# Patient Record
Sex: MALE | Race: WHITE | Employment: FULL TIME | URBAN - METROPOLITAN AREA
[De-identification: names, ages, dates, MRNs, and addresses within clinical notes are randomized per-mention and may not be internally consistent; named-entity substitution may affect disease eponyms.]

---

## 2017-02-22 ENCOUNTER — HOSPITAL ENCOUNTER (OUTPATIENT)
Dept: MRI IMAGING | Age: 47
Discharge: HOME | End: 2017-02-22
Attending: ORTHOPAEDIC SURGERY
Payer: COMMERCIAL

## 2017-02-22 DIAGNOSIS — M25.562 LEFT KNEE PAIN: ICD-10-CM

## 2017-02-22 PROCEDURE — 73721 MRI JNT OF LWR EXTRE W/O DYE: CPT

## 2017-09-19 ENCOUNTER — OFFICE VISIT (OUTPATIENT)
Dept: FAMILY MEDICINE CLINIC | Age: 47
End: 2017-09-19

## 2017-09-19 VITALS
DIASTOLIC BLOOD PRESSURE: 74 MMHG | HEART RATE: 84 BPM | BODY MASS INDEX: 23.62 KG/M2 | SYSTOLIC BLOOD PRESSURE: 116 MMHG | WEIGHT: 165 LBS | RESPIRATION RATE: 16 BRPM | TEMPERATURE: 98 F | OXYGEN SATURATION: 96 % | HEIGHT: 70 IN

## 2017-09-19 DIAGNOSIS — Z00.00 ENCOUNTER FOR WELLNESS EXAMINATION IN ADULT: Primary | ICD-10-CM

## 2017-09-19 DIAGNOSIS — Z23 ENCOUNTER FOR IMMUNIZATION: ICD-10-CM

## 2017-09-19 NOTE — PATIENT INSTRUCTIONS

## 2017-09-19 NOTE — PROGRESS NOTES
Chief Complaint   Patient presents with    Complete Physical     1. Have you been to the ER, urgent care clinic since your last visit? Hospitalized since your last visit? No    2. Have you seen or consulted any other health care providers outside of the [address] [place] [address] since your last visit? Include any pap smears or colon screening.  No

## 2017-09-19 NOTE — PROGRESS NOTES
Kailee Rod is an 55 y.o. male who presents for well male exam.     Doing well. No complaints. Diet: regular but healthy    Exercise: once a month   does not smoke, 6 beers a week,  and one sexual partner      Allergies- reviewed:   No Known Allergies      Medications- reviewed:   No current outpatient prescriptions on file. No current facility-administered medications for this visit. Past Medical History- reviewed:  Past Medical History:   Diagnosis Date    Hypercholesterolemia     Migraines          Past Surgical History- reviewed:   Past Surgical History:   Procedure Laterality Date    HX HEENT  2000    lymph node excision, L neck - benign    HX ORTHOPAEDIC      arthroscopy of right knee - Dr Chance Villalba      arthroscopy of left knee - Dr. Nasima Dalal HX ORTHOPAEDIC      R shoulder         Family History - reviewed:  Family History   Problem Relation Age of Onset    Allergic Rhinitis Father     Alcohol abuse Paternal Grandfather     Hypertension Paternal Grandfather     Heart Disease Paternal Grandfather     Coronary Artery Disease Paternal Grandfather      Past Surgical History:   Procedure Laterality Date    HX HEENT  2000    lymph node excision, L neck - benign    HX ORTHOPAEDIC      arthroscopy of right knee - Dr Marvin Carbone ORTHOPAEDIC      arthroscopy of left knee - Dr. Nasima Dalal HX ORTHOPAEDIC      R shoulder         Social History - reviewed:  Social History     Social History    Marital status: LEGALLY      Spouse name: N/A    Number of children: N/A    Years of education: N/A     Occupational History    Not on file.      Social History Main Topics    Smoking status: Former Smoker     Quit date: 10/24/1988    Smokeless tobacco: Never Used    Alcohol use 3.0 oz/week     6 Cans of beer per week    Drug use: No    Sexual activity: Yes     Partners: Female     Other Topics Concern    Not on file     Social History Narrative Immunizations - reviewed:   Immunization History   Administered Date(s) Administered    Influenza Vaccine (Quad) PF 09/29/2016    TD Vaccine 05/13/2003    Tdap 09/29/2016     Flu: Today  Tdap: not due until 2026      Health Maintenance reviewed -   HIV testing refused. Review of systems:  Items bolded if positive. Constitutional: Fever, chills, night sweats, weight loss, lymphadenopathy, fatigue  HEENT: Vision change, eye pain, rhinorrhea, sinus pain, epistaxis, dysphagia, change in hearing, tinnitus, vertigo. Endocrine: Weight change, heat/ cold intolerance, tremor, insomnia, polyuria, polydipsia, polyphagia, abnl hair growth, nail changes  Cardiovascular: Chest pain, palpitations, syncope, lower extremity edema, orthopnea, paroxysmal nocturnal dyspnea  Pulmonary: Shortness of breath, dyspnea on exertion, cough, hemoptysis, wheezing  GI: Nausea, vomiting, diarrhea, melena, hematochezia, change in appetite, abdominal pain, change in bowel habits or stools  : Dysuria, frequency, urgency, incontinence, hematuria, nocturia  Musculoskeletal: joint swelling or pain, muscle pain, back pain  Skin:  Rash, New/growing/changing skin lesions  Neurologic: Headache, muscle weakness, paresthesias, anesthesia, ataxia, change in speech, change in gait   Psychiatric: depression, anxiety, hallucinations, trae, SI/HI        Objective:     Visit Vitals    /74    Pulse 84    Temp 98 °F (36.7 °C) (Oral)    Resp 16    Ht 5' 10\" (1.778 m)    Wt 165 lb (74.8 kg)    SpO2 96%    BMI 23.68 kg/m2       General: Well-developed, well-nourished in NAD. HEENT: Normocephalic, atraumatic. Moist mucous membranes. No cervical LAD. Neck: Supple. Cardio: Regular rate and rhythm with no murmurs, rubs, or gallops. Lungs: Normal effort and excursion with no grunting, nasal flaring, or subcostal retractions. Lungs clear to ausculation bilaterally with no wheezes, rales, or rhonchi.   Abdomen: Soft, non-distended, non-tender with normoactive bowel sounds. No hepatosplenomegaly or masses. Extremities: No erythema or edema. Neuro: Awake and alert. Moves extremities equally. Musculo skeletal: wnl, except squatting down completely causing mild pain ( has seen an orthopedist in the past and is not concerned about knee now)  Psych: Appropriate mood and affect. Skin: No obvious rashes or signs of tissue breakdown. Assessment:       ICD-10-CM ICD-9-CM    1. Encounter for wellness examination in adult Z00.00 V70.0 CBC W/O DIFF      METABOLIC PANEL, COMPREHENSIVE      LIPID PANEL   2. Encounter for immunization Z23 V03.89 INFLUENZA VIRUS VAC QUAD,SPLIT,PRESV FREE SYRINGE IM         Plan:   · Counseled re: diet, exercise 150 min/ week ( emphasized swimming and bicycle due to chronic h/o knee pain), healthy lifestyle, cutting down on alcohol. · Appropriate labs, vaccines as listed above    Follow-up Disposition:  Return in about 1 year (around 9/19/2018), or if symptoms worsen or fail to improve. I have discussed the diagnosis with the patient and the intended plan as seen in the above orders. Patient verbalized understanding of the plan and agrees with the plan. The patient has received an after-visit summary and questions were answered concerning future plans. I have discussed medication side effects and warnings with the patient as well. Informed patient to return to the office if new symptoms arise. Laurita Fitzgerald MD  Family Medicine Resident

## 2017-09-19 NOTE — MR AVS SNAPSHOT
Visit Information Date & Time Provider Department Dept. Phone Encounter #  
 9/19/2017  8:00 AM Laurita Head, [address] [facility] [phone] [id number] Follow-up Instructions Return in about 1 year (around 9/19/2018), or if symptoms worsen or fail to improve. Upcoming Health Maintenance Date Due INFLUENZA AGE 9 TO ADULT 8/1/2017 DTaP/Tdap/Td series (2 - Td) 9/29/2026 Allergies as of 9/19/2017  Review Complete On: 9/19/2017 By: Chung Obrien LPN No Known Allergies Current Immunizations  Reviewed on 9/29/2016 Name Date Influenza Vaccine (Quad) PF  Incomplete, 9/29/2016 TD Vaccine 5/13/2003 Tdap 9/29/2016 Not reviewed this visit You Were Diagnosed With   
  
 Codes Comments Encounter for wellness examination in adult    -  Primary ICD-10-CM: Z00.00 ICD-9-CM: V70.0 Encounter for immunization     ICD-10-CM: H50 ICD-9-CM: V03.89 Vitals BP Pulse Temp Resp Height(growth percentile) Weight(growth percentile) 116/74 84 98 °F (36.7 °C) (Oral) 16 5' 10\" (1.778 m) 165 lb (74.8 kg) SpO2 BMI Smoking Status 96% 23.68 kg/m2 Former Smoker Vitals History BMI and BSA Data Body Mass Index Body Surface Area  
 23.68 kg/m 2 1.92 m 2 Preferred Pharmacy Pharmacy Name Phone [id number] [place], [address] [facility] [phone] Your Updated Medication List  
  
Notice  As of 9/19/2017  8:25 AM  
 You have not been prescribed any medications. We Performed the Following CBC W/O DIFF [46567 CPT(R)] INFLUENZA VIRUS VAC QUAD,SPLIT,PRESV FREE SYRINGE IM R830641 CPT(R)] LIPID PANEL [96707 CPT(R)] METABOLIC PANEL, COMPREHENSIVE [59982 CPT(R)] Follow-up Instructions Return in about 1 year (around 9/19/2018), or if symptoms worsen or fail to improve. Patient Instructions Well Visit, Ages 25 to 48: Care Instructions Your Care Instructions Physical exams can help you stay healthy. Your doctor has checked your overall health and may have suggested ways to take good care of yourself. He or she also may have recommended tests. At home, you can help prevent illness with healthy eating, regular exercise, and other steps. Follow-up care is a key part of your treatment and safety. Be sure to make and go to all appointments, and call your doctor if you are having problems. It's also a good idea to know your test results and keep a list of the medicines you take. How can you care for yourself at home? · Reach and stay at a healthy weight. This will lower your risk for many problems, such as obesity, diabetes, heart disease, and high blood pressure. · Get at least 30 minutes of physical activity on most days of the week. Walking is a good choice. You also may want to do other activities, such as running, swimming, cycling, or playing tennis or team sports. Discuss any changes in your exercise program with your doctor. · Do not smoke or allow others to smoke around you. If you need help quitting, talk to your doctor about stop-smoking programs and medicines. These can increase your chances of quitting for good. · Talk to your doctor about whether you have any risk factors for sexually transmitted infections (STIs). Having one sex partner (who does not have STIs and does not have sex with anyone else) is a good way to avoid these infections. · Use birth control if you do not want to have children at this time. Talk with your doctor about the choices available and what might be best for you. · Protect your skin from too much sun. When you're outdoors from 10 a.m. to 4 p.m., stay in the shade or cover up with clothing and a hat with a wide brim. Wear sunglasses that block UV rays. Even when it's cloudy, put broad-spectrum sunscreen (SPF 30 or higher) on any exposed skin. · See a dentist one or two times a year for checkups and to have your teeth cleaned. · Wear a seat belt in the car. · Drink alcohol in moderation, if at all. That means no more than 2 drinks a day for men and 1 drink a day for women. Follow your doctor's advice about when to have certain tests. These tests can spot problems early. For everyone · Cholesterol. Have the fat (cholesterol) in your blood tested after age 21. Your doctor will tell you how often to have this done based on your age, family history, or other things that can increase your risk for heart disease. · Blood pressure. Have your blood pressure checked during a routine doctor visit. Your doctor will tell you how often to check your blood pressure based on your age, your blood pressure results, and other factors. · Vision. Talk with your doctor about how often to have a glaucoma test. 
· Diabetes. Ask your doctor whether you should have tests for diabetes. · Colon cancer. Have a test for colon cancer at age 48. You may have one of several tests. If you are younger than 48, you may need a test earlier if you have any risk factors. Risk factors include whether you already had a precancerous polyp removed from your colon or whether your parent, brother, sister, or child has had colon cancer. For women · Breast exam and mammogram. Talk to your doctor about when you should have a clinical breast exam and a mammogram. Medical experts differ on whether and how often women under 50 should have these tests. Your doctor can help you decide what is right for you. · Pap test and pelvic exam. Begin Pap tests at age 24. A Pap test is the best way to find cervical cancer. The test often is part of a pelvic exam. Ask how often to have this test. 
· Tests for sexually transmitted infections (STIs). Ask whether you should have tests for STIs. You may be at risk if you have sex with more than one person, especially if your partners do not wear condoms. For men · Tests for sexually transmitted infections (STIs). Ask whether you should have tests for STIs. You may be at risk if you have sex with more than one person, especially if you do not wear a condom. · Testicular cancer exam. Ask your doctor whether you should check your testicles regularly. · Prostate exam. Talk to your doctor about whether you should have a blood test (called a PSA test) for prostate cancer. Experts differ on whether and when men should have this test. Some experts suggest it if you are older than 39 and are -American or have a father or brother who got prostate cancer when he was younger than 72. When should you call for help? Watch closely for changes in your health, and be sure to contact your doctor if you have any problems or symptoms that concern you. Where can you learn more? Go to http://srinivasan-gwen.info/. Enter P072 in the search box to learn more about \"Well Visit, Ages 25 to 48: Care Instructions. \" Current as of: July 19, 2016 Content Version: 11.3 © 7628-7139 [employer]. Care instructions adapted under license by [employer] (which disclaims liability or warranty for this information). If you have questions about a medical condition or this instruction, always ask your healthcare professional. [address] any warranty or liability for your use of this information. Introducing [facility]! Dear Thor Chowdhury: 
Thank you for requesting a Doorbot account. Our records indicate that you already have an active Doorbot account. You can access your account anytime at [email or web address]. [employer] Did you know that you can access your hospital and ER discharge instructions at any time in Doorbot? You can also review all of your test results from your hospital stay or ER visit. Additional Information If you have questions, please visit the Frequently Asked Questions section of the EventMama website at [email or web address] (ServiceCEO). [email or web address]. [email or web address] Remember, EventMama is NOT to be used for urgent needs. For medical emergencies, dial 911. Now available from your iPhone and Android! Please provide this summary of care documentation to your next provider. Your primary care clinician is listed as NONE. If you have any questions after today's visit, please call [phone].

## 2021-08-24 ENCOUNTER — HOSPITAL ENCOUNTER (OUTPATIENT)
Dept: PREADMISSION TESTING | Age: 51
Discharge: HOME | End: 2021-08-24
Payer: COMMERCIAL

## 2021-08-24 VITALS
SYSTOLIC BLOOD PRESSURE: 124 MMHG | HEART RATE: 78 BPM | RESPIRATION RATE: 18 BRPM | DIASTOLIC BLOOD PRESSURE: 73 MMHG | TEMPERATURE: 97.7 F | WEIGHT: 160 LBS | HEIGHT: 69 IN | BODY MASS INDEX: 23.7 KG/M2 | OXYGEN SATURATION: 97 %

## 2021-08-24 LAB
25(OH)D3 SERPL-MCNC: 35.9 NG/ML (ref 30–100)
ABO + RH BLD: NORMAL
ALBUMIN SERPL-MCNC: 4.4 G/DL (ref 3.5–5)
ALBUMIN/GLOB SERPL: 1.3 {RATIO} (ref 1.1–2.2)
ALP SERPL-CCNC: 61 U/L (ref 45–117)
ALT SERPL-CCNC: 59 U/L (ref 12–78)
ANION GAP SERPL CALC-SCNC: 5 MMOL/L (ref 5–15)
APPEARANCE UR: ABNORMAL
APTT PPP: 22 SEC (ref 22.1–31)
AST SERPL-CCNC: 27 U/L (ref 15–37)
BACTERIA URNS QL MICRO: NEGATIVE /HPF
BASOPHILS # BLD: 0 K/UL (ref 0–0.1)
BASOPHILS NFR BLD: 0 % (ref 0–1)
BILIRUB SERPL-MCNC: 0.7 MG/DL (ref 0.2–1)
BILIRUB UR QL: NEGATIVE
BLOOD GROUP ANTIBODIES SERPL: NORMAL
BUN SERPL-MCNC: 12 MG/DL (ref 6–20)
BUN/CREAT SERPL: 13 (ref 12–20)
CALCIUM SERPL-MCNC: 9 MG/DL (ref 8.5–10.1)
CHLORIDE SERPL-SCNC: 106 MMOL/L (ref 97–108)
CO2 SERPL-SCNC: 28 MMOL/L (ref 21–32)
COLOR UR: ABNORMAL
CREAT SERPL-MCNC: 0.93 MG/DL (ref 0.7–1.3)
DIFFERENTIAL METHOD BLD: ABNORMAL
EOSINOPHIL # BLD: 0.1 K/UL (ref 0–0.4)
EOSINOPHIL NFR BLD: 2 % (ref 0–7)
EPITH CASTS URNS QL MICRO: ABNORMAL /LPF
ERYTHROCYTE [DISTWIDTH] IN BLOOD BY AUTOMATED COUNT: 12.4 % (ref 11.5–14.5)
EST. AVERAGE GLUCOSE BLD GHB EST-MCNC: 97 MG/DL
GLOBULIN SER CALC-MCNC: 3.3 G/DL (ref 2–4)
GLUCOSE SERPL-MCNC: 107 MG/DL (ref 65–100)
GLUCOSE UR STRIP.AUTO-MCNC: NEGATIVE MG/DL
HBA1C MFR BLD: 5 % (ref 4–5.6)
HCT VFR BLD AUTO: 42.6 % (ref 36.6–50.3)
HGB BLD-MCNC: 14.2 G/DL (ref 12.1–17)
HGB UR QL STRIP: NEGATIVE
HYALINE CASTS URNS QL MICRO: ABNORMAL /LPF (ref 0–5)
IMM GRANULOCYTES # BLD AUTO: 0 K/UL (ref 0–0.04)
IMM GRANULOCYTES NFR BLD AUTO: 0 % (ref 0–0.5)
INR PPP: 1 (ref 0.9–1.1)
KETONES UR QL STRIP.AUTO: NEGATIVE MG/DL
LEUKOCYTE ESTERASE UR QL STRIP.AUTO: NEGATIVE
LYMPHOCYTES # BLD: 1.2 K/UL (ref 0.8–3.5)
LYMPHOCYTES NFR BLD: 22 % (ref 12–49)
MCH RBC QN AUTO: 33.4 PG (ref 26–34)
MCHC RBC AUTO-ENTMCNC: 33.3 G/DL (ref 30–36.5)
MCV RBC AUTO: 100.2 FL (ref 80–99)
MONOCYTES # BLD: 0.6 K/UL (ref 0–1)
MONOCYTES NFR BLD: 13 % (ref 5–13)
NEUTS SEG # BLD: 3.2 K/UL (ref 1.8–8)
NEUTS SEG NFR BLD: 63 % (ref 32–75)
NITRITE UR QL STRIP.AUTO: NEGATIVE
NRBC # BLD: 0 K/UL (ref 0–0.01)
NRBC BLD-RTO: 0 PER 100 WBC
PH UR STRIP: 6.5 [PH] (ref 5–8)
PLATELET # BLD AUTO: 274 K/UL (ref 150–400)
PMV BLD AUTO: 10.1 FL (ref 8.9–12.9)
POTASSIUM SERPL-SCNC: 4.4 MMOL/L (ref 3.5–5.1)
PROT SERPL-MCNC: 7.7 G/DL (ref 6.4–8.2)
PROT UR STRIP-MCNC: NEGATIVE MG/DL
PROTHROMBIN TIME: 10.2 SEC (ref 9–11.1)
RBC # BLD AUTO: 4.25 M/UL (ref 4.1–5.7)
RBC #/AREA URNS HPF: ABNORMAL /HPF (ref 0–5)
SODIUM SERPL-SCNC: 139 MMOL/L (ref 136–145)
SP GR UR REFRACTOMETRY: 1.01 (ref 1–1.03)
SPECIMEN EXP DATE BLD: NORMAL
THERAPEUTIC RANGE,PTTT: ABNORMAL SECS (ref 58–77)
UA: UC IF INDICATED,UAUC: ABNORMAL
UROBILINOGEN UR QL STRIP.AUTO: 0.2 EU/DL (ref 0.2–1)
WBC # BLD AUTO: 5.1 K/UL (ref 4.1–11.1)
WBC URNS QL MICRO: ABNORMAL /HPF (ref 0–4)

## 2021-08-24 PROCEDURE — 81001 URINALYSIS AUTO W/SCOPE: CPT

## 2021-08-24 PROCEDURE — 82306 VITAMIN D 25 HYDROXY: CPT

## 2021-08-24 PROCEDURE — 80053 COMPREHEN METABOLIC PANEL: CPT

## 2021-08-24 PROCEDURE — 36415 COLL VENOUS BLD VENIPUNCTURE: CPT

## 2021-08-24 PROCEDURE — 93005 ELECTROCARDIOGRAM TRACING: CPT

## 2021-08-24 PROCEDURE — 83036 HEMOGLOBIN GLYCOSYLATED A1C: CPT

## 2021-08-24 PROCEDURE — 86901 BLOOD TYPING SEROLOGIC RH(D): CPT

## 2021-08-24 PROCEDURE — 85610 PROTHROMBIN TIME: CPT

## 2021-08-24 PROCEDURE — 85730 THROMBOPLASTIN TIME PARTIAL: CPT

## 2021-08-24 PROCEDURE — 85025 COMPLETE CBC W/AUTO DIFF WBC: CPT

## 2021-08-24 RX ORDER — GABAPENTIN 300 MG/1
1 CAPSULE ORAL
COMMUNITY
End: 2022-11-02 | Stop reason: ALTCHOICE

## 2021-08-24 RX ORDER — ACETAMINOPHEN 500 MG
1000 TABLET ORAL
COMMUNITY
End: 2022-11-02 | Stop reason: ALTCHOICE

## 2021-08-24 NOTE — H&P
Preoperative Evaluation                     History and Physical with Surgical Risk Stratification     8/24/2021    CC: Low back pain    HPI:   Luke Mclean is a 48 y.o. male referred for pre-operative evaluation by Dr. Aspen Kim for surgery on 9/7/21. Annabella Evens notes he has had years of low back pain but in January he was lifting a dresser. The dresser slipped and he lurched forward to catch it causing severe pain in his left sided of low back. His pain goes from his hip, buttocks down to foot. His pain is worse with driving but since he has been out of work for 2 months he is virtually pain free. MRI Results:  L4/L5: There is 3 mm of anterolisthesis of L4 on L5. There is bilateral facet hypertrophy. There is a large left synovial cyst projecting into the spinal canal at the left lateral recess. This synovial cyst measures 8 x 4 mm in the transaxial plane and extends for a craniocaudad dimension of 11 mm. There is impingement to the traversing left L5 nerve root. The patient was evaluated in the surgeon's office and it was determined that the most appropriate plan of care is to proceed with surgical intervention. Patient's PCP Trinh Charles MD    Review of Systems     Constitutional: Negative for chills and fever  Throat: Negative for congestion and sore throat  Eyes: Negative for blurred vision and double vision  Respiratory: Negative for cough, shortness of breath and wheezing  Mouth: Negative for loose, broken or chipped teeth. Cardiovascular: Negative for chest pain and palpitations  Gastrointestinal: Negative for abdominal pain, nausea, diarrhea & constipation  Genitourinary: Negative for dysuria and hematuria  Musculoskeletal: Low back pain  Skin: Negative for rash, open wounds.    Neurological: Negative for dizziness, headaches, tremors  Psychiatric: Negative for anxiety    Inherent Risk of Surgery     Surgical risk:  Intermediate  Intermediate:  Joint Replacement, Spinal surgery, abdominal, thoracic, carotid endarterctomy, prostate, head and neck    Patient Cardiac Risk Assessment     Revised Cardiac Risk Index (RCRI)    Rate if cardiac death, nonfatal MI, nonfatal cardiac arrest by number of risk factor- 0.4%    BRIAN/AHA 2007 Guidelines:   1) Surgery Emergency, Non-cardiac -> to surgery  2) If not, look at clinical predictors    Intermediate Minor     Blood Thinner: NA    METS      EQUAL TO 4 Care for self Walk indoors around house Walk 2-3 blocks on level ground (2-3 mph) Light work around house (dust, dishes)     Other Risk Factors:   Screening for ETOH use:  Done and low risk  Smoking status:  Former   Marijuana Use:  No    Personal or FH of bleeding problems:  No  Personal or FH of blood clots:  No  Personal or FH of anesthesia problems:   Yes    Pulmonary Risk:  Asthma or COPD:  No  Body mass index is 23.63 kg/m². Known RAHUL:  No    Past Medical, Surgical, Social History     Allergies: No Known Allergies      Current Outpatient Medications:     gabapentin (NEURONTIN) 300 mg capsule, Take 1 Tablet by mouth nightly., Disp: , Rfl:     acetaminophen (Tylenol Extra Strength) 500 mg tablet, Take 1,000 mg by mouth every six (6) hours as needed for Pain., Disp: , Rfl:     MULTIVITAMIN PO, Take 1 Tablet by mouth daily. 50+, Disp: , Rfl:     prasterone, DHEA, (DHEA PO), Take 1 Tablet by mouth daily. , Disp: , Rfl:      Past Medical History:   Diagnosis Date    Adverse effect of anesthesia     facial itchin upon waking up; resolved with benedryl    Chronic low back pain     COVID-19 2021    COVID-19 vaccine series completed     Hypercholesterolemia      Past Surgical History:   Procedure Laterality Date    HX KNEE ARTHROSCOPY Bilateral     HX LYMPH NODE DISSECTION Left     HX ROTATOR CUFF REPAIR Right      Social History     Tobacco Use    Smoking status: Former Smoker     Types: Cigarettes     Quit date: 10/24/1988     Years since quittin.8    Smokeless tobacco: Never Used   Substance Use Topics    Alcohol use: Yes     Alcohol/week: 5.0 standard drinks     Types: 6 Cans of beer per week    Drug use: No     Family History   Problem Relation Age of Onset    Allergic Rhinitis Father     Alcohol abuse Paternal Grandfather     Hypertension Paternal Grandfather     Heart Disease Paternal Grandfather     Coronary Artery Disease Paternal Grandfather        Objective     Vitals:    08/24/21 1131   BP: 124/73   Pulse: 78   Resp: 18   Temp: 97.7 °F (36.5 °C)   SpO2: 97%   Weight: 72.6 kg (160 lb)   Height: 5' 9\" (1.753 m)       General Appearance: Alert, Well Appearing and in no distress  Mental Status: Normal mood, behavior, speech and dress  Neck: Normal appearance externally  Ears: External canal no drainage  Nose: Normal external appearance and no drainage   Chest: Clear to auscultation, no wheezes, rales or rhonchi  Heart: Normal rate, regular rhythm, no murmurs, rubs, clicks or gallops  Skin: Normal color, no lesions externally  Abdomen: Not examined  Neuro: Not examined  Musculoskeletal: Gait antalgic    Recent Results (from the past 168 hour(s))   CBC WITH AUTOMATED DIFF    Collection Time: 08/24/21 11:13 AM   Result Value Ref Range    WBC 5.1 4.1 - 11.1 K/uL    RBC 4.25 4. 10 - 5.70 M/uL    HGB 14.2 12.1 - 17.0 g/dL    HCT 42.6 36.6 - 50.3 %    .2 (H) 80.0 - 99.0 FL    MCH 33.4 26.0 - 34.0 PG    MCHC 33.3 30.0 - 36.5 g/dL    RDW 12.4 11.5 - 14.5 %    PLATELET 445 040 - 661 K/uL    MPV 10.1 8.9 - 12.9 FL    NRBC 0.0 0  WBC    ABSOLUTE NRBC 0.00 0.00 - 0.01 K/uL    NEUTROPHILS 63 32 - 75 %    LYMPHOCYTES 22 12 - 49 %    MONOCYTES 13 5 - 13 %    EOSINOPHILS 2 0 - 7 %    BASOPHILS 0 0 - 1 %    IMMATURE GRANULOCYTES 0 0.0 - 0.5 %    ABS. NEUTROPHILS 3.2 1.8 - 8.0 K/UL    ABS. LYMPHOCYTES 1.2 0.8 - 3.5 K/UL    ABS. MONOCYTES 0.6 0.0 - 1.0 K/UL    ABS. EOSINOPHILS 0.1 0.0 - 0.4 K/UL    ABS. BASOPHILS 0.0 0.0 - 0.1 K/UL    ABS. IMM.  GRANS. 0.0 0.00 - 0.04 K/UL    DF AUTOMATED     METABOLIC PANEL, COMPREHENSIVE    Collection Time: 08/24/21 11:13 AM   Result Value Ref Range    Sodium 139 136 - 145 mmol/L    Potassium 4.4 3.5 - 5.1 mmol/L    Chloride 106 97 - 108 mmol/L    CO2 28 21 - 32 mmol/L    Anion gap 5 5 - 15 mmol/L    Glucose 107 (H) 65 - 100 mg/dL    BUN 12 6 - 20 MG/DL    Creatinine 0.93 0.70 - 1.30 MG/DL    BUN/Creatinine ratio 13 12 - 20      GFR est AA >60 >60 ml/min/1.73m2    GFR est non-AA >60 >60 ml/min/1.73m2    Calcium 9.0 8.5 - 10.1 MG/DL    Bilirubin, total 0.7 0.2 - 1.0 MG/DL    ALT (SGPT) 59 12 - 78 U/L    AST (SGOT) 27 15 - 37 U/L    Alk. phosphatase 61 45 - 117 U/L    Protein, total 7.7 6.4 - 8.2 g/dL    Albumin 4.4 3.5 - 5.0 g/dL    Globulin 3.3 2.0 - 4.0 g/dL    A-G Ratio 1.3 1.1 - 2.2     HEMOGLOBIN A1C WITH EAG    Collection Time: 08/24/21 11:13 AM   Result Value Ref Range    Hemoglobin A1c 5.0 4.0 - 5.6 %    Est. average glucose 97 mg/dL   CULTURE, MRSA    Collection Time: 08/24/21 11:13 AM    Specimen: Nares; Nasal   Result Value Ref Range    Special Requests: NO SPECIAL REQUESTS      Culture result: MRSA NOT PRESENT      Culture result:        Screening of patient nares for MRSA is for surveillance purposes and, if positive, to facilitate isolation considerations in high risk settings. It is not intended for automatic decolonization interventions per se as regimens are not sufficiently effective to warrant routine use.    PROTHROMBIN TIME + INR    Collection Time: 08/24/21 11:13 AM   Result Value Ref Range    INR 1.0 0.9 - 1.1      Prothrombin time 10.2 9.0 - 11.1 sec   PTT    Collection Time: 08/24/21 11:13 AM   Result Value Ref Range    aPTT 22.0 (L) 22.1 - 31.0 sec    aPTT, therapeutic range     58.0 - 77.0 SECS   URINALYSIS W/ REFLEX CULTURE    Collection Time: 08/24/21 11:13 AM    Specimen: Urine   Result Value Ref Range    Color YELLOW/STRAW      Appearance CLOUDY (A) CLEAR      Specific gravity 1.009 1.003 - 1.030      pH (UA) 6.5 5.0 - 8.0 Protein Negative NEG mg/dL    Glucose Negative NEG mg/dL    Ketone Negative NEG mg/dL    Bilirubin Negative NEG      Blood Negative NEG      Urobilinogen 0.2 0.2 - 1.0 EU/dL    Nitrites Negative NEG      Leukocyte Esterase Negative NEG      WBC 0-4 0 - 4 /hpf    RBC 0-5 0 - 5 /hpf    Epithelial cells FEW FEW /lpf    Bacteria Negative NEG /hpf    UA:UC IF INDICATED CULTURE NOT INDICATED BY UA RESULT CNI      Hyaline cast 0-2 0 - 5 /lpf   VITAMIN D, 25 HYDROXY    Collection Time: 08/24/21 11:13 AM   Result Value Ref Range    Vitamin D 25-Hydroxy 35.9 30 - 100 ng/mL   TYPE & SCREEN    Collection Time: 08/24/21 11:13 AM   Result Value Ref Range    Crossmatch Expiration 09/07/2021,2359     ABO/Rh(D) A POSITIVE     Antibody screen NEG    EKG, 12 LEAD, INITIAL    Collection Time: 08/24/21 12:19 PM   Result Value Ref Range    Ventricular Rate 68 BPM    Atrial Rate 68 BPM    P-R Interval 136 ms    QRS Duration 90 ms    Q-T Interval 384 ms    QTC Calculation (Bezet) 408 ms    Calculated P Axis 50 degrees    Calculated R Axis 37 degrees    Calculated T Axis 37 degrees    Diagnosis       Normal sinus rhythm  Normal ECG  No previous ECGs available  Confirmed by Cheryl Luke MD., Sharmaine Marks ([id number]) on 8/25/2021 6:00:21 PM         Assessment and Plan     Assessment/Plan:   1) Lumbar Synovial Cyst       Pre-Operative Evaluation    Plan:  L4-5 Laminectomy  Labs and EKG reviewed. MRSA negative      Preoperative Risk Stratification:    Per RCRI, the patient has a 0.4% risk of cardiac death, nonfatal MI, nonfatal cardiac arrest based on no risk factors. Per ACC/AHA guidelines, patient is low risk for a(n) intermedate risk surgery and may proceed to planned surgery with the above noted risk.     Ginger Abreu, ROGER

## 2021-08-24 NOTE — PERIOP NOTES
Patient states that he has had facial itching that was resolved with benadryl on multiple occasions upon awakening from anesthesia. Patient does not know if or what drug caused the itching.   Patient also states same experience after pain medications, but does not know which medication caused the itching; and states he has never been told not to take any particular medication by a medical professional.

## 2021-08-24 NOTE — PERIOP NOTES
[address] , [address] [facility]   MAIN OR                                  [phone]   MAIN PRE OP                          [phone]                                                                                AMBULATORY PRE OP          [phone]  PRE-ADMISSION TESTING    [phone]   Surgery Date:  September 7, 2021       Is surgery arrival time given by surgeon? NO  If NO, [facility] staff will call you between 3 and 7pm the day before your surgery with your arrival time. (If your surgery is on a Monday, we will call you the Friday before.)    Call [phone] after 7pm Monday-Friday if you did not receive this call. INSTRUCTIONS BEFORE YOUR SURGERY   When You  Arrive Arrive at the [address] [facility] on the day of your surgery  Have your insurance card, photo ID, and any copayment (if needed)   Food   and   Drink NO food or drink after midnight the night before surgery    This means NO water, gum, mints, coffee, juice, etc.  No alcohol (beer, wine, liquor) 24 hours before and after surgery   Medications to   TAKE   Morning of Surgery MEDICATIONS TO TAKE THE MORNING OF SURGERY WITH A SIP OF WATER:    Tylenol if needed   Medications  To  STOP      7 days before surgery  Non-Steroidal anti-inflammatory Drugs (NSAID's): for example, Ibuprofen (Advil, Motrin), Naproxen (Aleve)   Aspirin, if taking for pain    Herbal supplements, vitamins, and fish oil   Other: DHEA  (Pain medications not listed above, including Tylenol may be taken)   Blood  Thinners  If you take  Aspirin, Plavix, Coumadin, or any blood-thinning or anti-blood clot medicine, talk to the doctor who prescribed the medications for pre-operative instructions.    Bathing Clothing  Jewelry  Valuables      If you shower the morning of surgery, please do not apply anything to your skin (lotions, powders, deodorant, or makeup, especially mascara)   Follow Chlorhexidine Care Fusion body wash instructions provided to you during PAT appointment. Begin 3 days prior to surgery.  Do not shave or trim anywhere 24 hours before surgery   Wear your hair loose or down; no pony-tails, buns, or metal hair clips   Wear loose, comfortable, clean clothes   Wear glasses instead of contacts  [employer] money, valuables, and jewelry, including body piercings, at home   If you were given an [employer], bring it on day of surgery.  Please bring your Advance Directive papers the day of surgery     Going Home - or Spending the Night  SAME-DAY SURGERY: You must have a responsible adult drive you home and stay with you 24 hours after surgery   ADMITS: If your doctor is keeping you in the hospital after surgery, leave personal belongings/luggage in your car until you have a hospital room number. Hospital discharge time is 12 noon  Drivers must be here before 12 noon unless you are told differently   Special Instructions You may have one visitor age 13 or older at a time per current hospital policy. Follow all instructions so your surgery wont be cancelled. Please, be on time. If a situation occurs and you are delayed the day of surgery, call [phone]. If your physical condition changes (like a fever, cold, flu, etc.) call your surgeon. Home medication(s) reviewed and verified via LIST and VERBAL   during PAT appointment. The patient was contacted  IN-PERSON  The patient verbalizes understanding of all instructions and   DOES   DOES NOT   need reinforcement. It is now mandated that all surgical patients be tested for COVID-19 prior to surgery. Testing has to be exactly 4 days prior to surgery. Your COVID test date is Friday, September 3, 2021 between 8:00 am and 11:00 am.       COVID testing will be performed curbside at the [facility] [address]. There will be signs leading you to the testing site.  You will need to bring a photo ID with you to be swabbed. Patients are advised to self-quarantine at home after testing and prior to your surgery date. You will be notified if your results are positive. What to watch for:   Coronavirus (COVID-19) affects different people in different ways   It also appears with a wide range of symptoms from mild to severe   Signs usually appear 2-14 days after exposure     If you develop any of the following, notify your doctor immediately:  o Fever  o Chills, with or without a shiver  o Muscle pain  o Headache  o Sore throat  o Dry cough  o New loss of taste or smell  o Tiredness      If you develop any of the following, call 911:  o Shortness of breath  o Difficulty breathing  o Chest pain  o New confusion  Blueness of fingers and/or lips  Special Instructions:  Use Chlorhexidine Care Fusion wash and sponges 3 days prior to surgery as instructed. Incentive spirometer given with instructions to practice at home and bring back to the hospital on the day of surgery. [facility] will contact you if your Hemoglobin A1C is greater than 7.5. Ensure/Glucerna  sample, nutritional information, and Ensure/Glucerna coupon given.   Pain pamphlet and Call Don't Fall reminder reviewed with patient.  o Bring PTA Medication list day of surgery with the last doses taken documented

## 2021-08-25 LAB
ATRIAL RATE: 68 BPM
BACTERIA SPEC CULT: NORMAL
BACTERIA SPEC CULT: NORMAL
CALCULATED P AXIS, ECG09: 50 DEGREES
CALCULATED R AXIS, ECG10: 37 DEGREES
CALCULATED T AXIS, ECG11: 37 DEGREES
DIAGNOSIS, 93000: NORMAL
P-R INTERVAL, ECG05: 136 MS
Q-T INTERVAL, ECG07: 384 MS
QRS DURATION, ECG06: 90 MS
QTC CALCULATION (BEZET), ECG08: 408 MS
SERVICE CMNT-IMP: NORMAL
VENTRICULAR RATE, ECG03: 68 BPM

## 2021-09-03 ENCOUNTER — HOSPITAL ENCOUNTER (OUTPATIENT)
Dept: PREADMISSION TESTING | Age: 51
Discharge: HOME | End: 2021-09-03
Payer: COMMERCIAL

## 2021-09-03 ENCOUNTER — ANESTHESIA EVENT (OUTPATIENT)
Dept: SURGERY | Age: 51
End: 2021-09-03
Payer: COMMERCIAL

## 2021-09-03 PROCEDURE — U0005 INFEC AGEN DETEC AMPLI PROBE: HCPCS

## 2021-09-04 LAB
SARS-COV-2, XPLCVT: NOT DETECTED
SOURCE, COVRS: NORMAL

## 2021-09-06 NOTE — ANESTHESIA PREPROCEDURE EVALUATION
Relevant Problems   No relevant active problems       Anesthetic History   No history of anesthetic complications            Review of Systems / Medical History  Patient summary reviewed, nursing notes reviewed and pertinent labs reviewed    Pulmonary  Within defined limits                 Neuro/Psych   Within defined limits           Cardiovascular              Hyperlipidemia         GI/Hepatic/Renal  Within defined limits              Endo/Other  Within defined limits           Other Findings              Physical Exam    Airway  Mallampati: II  TM Distance: 4 - 6 cm  Neck ROM: normal range of motion   Mouth opening: Normal     Cardiovascular    Rhythm: regular  Rate: normal         Dental    Dentition: Lower dentition intact, Upper dentition intact and Caps/crowns     Pulmonary  Breath sounds clear to auscultation               Abdominal         Other Findings            Anesthetic Plan    ASA: 2  Anesthesia type: general          Induction: Intravenous  Anesthetic plan and risks discussed with: Patient

## 2021-09-07 ENCOUNTER — HOSPITAL ENCOUNTER (OUTPATIENT)
Age: 51
Setting detail: OBSERVATION
Discharge: HOME HEALTH | End: 2021-09-09
Attending: ORTHOPAEDIC SURGERY | Admitting: ORTHOPAEDIC SURGERY
Payer: COMMERCIAL

## 2021-09-07 ENCOUNTER — APPOINTMENT (OUTPATIENT)
Dept: GENERAL RADIOLOGY | Age: 51
End: 2021-09-07
Attending: ORTHOPAEDIC SURGERY
Payer: COMMERCIAL

## 2021-09-07 ENCOUNTER — ANESTHESIA (OUTPATIENT)
Dept: SURGERY | Age: 51
End: 2021-09-07
Payer: COMMERCIAL

## 2021-09-07 DIAGNOSIS — M43.16 SPONDYLOLISTHESIS, LUMBAR REGION: Primary | ICD-10-CM

## 2021-09-07 PROCEDURE — C1821 INTERSPINOUS IMPLANT: HCPCS | Performed by: ORTHOPAEDIC SURGERY

## 2021-09-07 PROCEDURE — C1713 ANCHOR/SCREW BN/BN,TIS/BN: HCPCS | Performed by: ORTHOPAEDIC SURGERY

## 2021-09-07 PROCEDURE — 76210000016 HC OR PH I REC 1 TO 1.5 HR: Performed by: ORTHOPAEDIC SURGERY

## 2021-09-07 PROCEDURE — 77030040361 HC SLV COMPR DVT MDII -B

## 2021-09-07 PROCEDURE — 77030033067 HC SUT PDO STRATFX SPIR J&J -B: Performed by: ORTHOPAEDIC SURGERY

## 2021-09-07 PROCEDURE — 2709999900 HC NON-CHARGEABLE SUPPLY: Performed by: ORTHOPAEDIC SURGERY

## 2021-09-07 PROCEDURE — 77030005513 HC CATH URETH FOL11 MDII -B: Performed by: ORTHOPAEDIC SURGERY

## 2021-09-07 PROCEDURE — 74011000250 HC RX REV CODE- 250: Performed by: NURSE ANESTHETIST, CERTIFIED REGISTERED

## 2021-09-07 PROCEDURE — 77030002982 HC SUT POLYSRB J&J -A: Performed by: ORTHOPAEDIC SURGERY

## 2021-09-07 PROCEDURE — 77030019908 HC STETH ESOPH SIMS -A: Performed by: NURSE ANESTHETIST, CERTIFIED REGISTERED

## 2021-09-07 PROCEDURE — 76060000036 HC ANESTHESIA 2.5 TO 3 HR: Performed by: ORTHOPAEDIC SURGERY

## 2021-09-07 PROCEDURE — 74011250636 HC RX REV CODE- 250/636: Performed by: ORTHOPAEDIC SURGERY

## 2021-09-07 PROCEDURE — C9290 INJ, BUPIVACAINE LIPOSOME: HCPCS | Performed by: ORTHOPAEDIC SURGERY

## 2021-09-07 PROCEDURE — 74011250636 HC RX REV CODE- 250/636: Performed by: NURSE ANESTHETIST, CERTIFIED REGISTERED

## 2021-09-07 PROCEDURE — 77030004391 HC BUR FLUT MEDT -C: Performed by: ORTHOPAEDIC SURGERY

## 2021-09-07 PROCEDURE — 77030026188 HC BN CANC CHP CRSH PR LIFV -E: Performed by: ORTHOPAEDIC SURGERY

## 2021-09-07 PROCEDURE — 99218 HC RM OBSERVATION: CPT

## 2021-09-07 PROCEDURE — 77030008684 HC TU ET CUF COVD -B: Performed by: NURSE ANESTHETIST, CERTIFIED REGISTERED

## 2021-09-07 PROCEDURE — 77030039267 HC ADH SKN EXOFIN S2SG -B: Performed by: ORTHOPAEDIC SURGERY

## 2021-09-07 PROCEDURE — 77030012406 HC DRN WND PENRS BARD -A: Performed by: ORTHOPAEDIC SURGERY

## 2021-09-07 PROCEDURE — 74011250637 HC RX REV CODE- 250/637: Performed by: ORTHOPAEDIC SURGERY

## 2021-09-07 PROCEDURE — 74011250636 HC RX REV CODE- 250/636: Performed by: ANESTHESIOLOGY

## 2021-09-07 PROCEDURE — 74011000250 HC RX REV CODE- 250: Performed by: ORTHOPAEDIC SURGERY

## 2021-09-07 PROCEDURE — 77030040466 HC GRFT MATRIX VIBONE REGE -I1: Performed by: ORTHOPAEDIC SURGERY

## 2021-09-07 PROCEDURE — 77030014650 HC SEAL MTRX FLOSEL BAXT -C: Performed by: ORTHOPAEDIC SURGERY

## 2021-09-07 PROCEDURE — 77030040922 HC BLNKT HYPOTHRM STRY -A

## 2021-09-07 PROCEDURE — 76010000172 HC OR TIME 2.5 TO 3 HR INTENSV-TIER 1: Performed by: ORTHOPAEDIC SURGERY

## 2021-09-07 PROCEDURE — 77030013079 HC BLNKT BAIR HGGR 3M -A: Performed by: NURSE ANESTHETIST, CERTIFIED REGISTERED

## 2021-09-07 PROCEDURE — 77030026438 HC STYL ET INTUB CARD -A: Performed by: NURSE ANESTHETIST, CERTIFIED REGISTERED

## 2021-09-07 PROCEDURE — 77030031139 HC SUT VCRL2 J&J -A: Performed by: ORTHOPAEDIC SURGERY

## 2021-09-07 DEVICE — 5.5MM CURVED ROD 35MM TI ALLOY
Type: IMPLANTABLE DEVICE | Site: SPINE LUMBAR | Status: FUNCTIONAL
Brand: TAURUS

## 2021-09-07 DEVICE — SCR BNE SPNE 6.5X40MM TI -- STREAMLINE TL: Type: IMPLANTABLE DEVICE | Site: SPINE LUMBAR | Status: FUNCTIONAL

## 2021-09-07 DEVICE — SCR BNE SPNE 6.5X45MM TI -- STREAMLINE TL: Type: IMPLANTABLE DEVICE | Site: SPINE LUMBAR | Status: FUNCTIONAL

## 2021-09-07 DEVICE — SCR SET SPNE STREAMLINE TL --: Type: IMPLANTABLE DEVICE | Site: SPINE LUMBAR | Status: FUNCTIONAL

## 2021-09-07 DEVICE — BONE CHIP CANC CRSH 1-8MM 30ML --: Type: IMPLANTABLE DEVICE | Site: SPINE LUMBAR | Status: FUNCTIONAL

## 2021-09-07 DEVICE — DURAGEN® SUTURABLE DURAL REGENERATION MATRIX, 2 IN X 2 IN (5 CM X 5 CM)
Type: IMPLANTABLE DEVICE | Site: SPINE LUMBAR | Status: FUNCTIONAL
Brand: DURAGEN® SUTURABLE

## 2021-09-07 DEVICE — VBR, BULLET-TIP OPTION, 15X22 SPACER
Type: IMPLANTABLE DEVICE | Site: SPINE LUMBAR | Status: FUNCTIONAL
Brand: BULLET-TIP PEEK VBR/IBF SYSTEM

## 2021-09-07 RX ORDER — FENTANYL CITRATE 50 UG/ML
INJECTION, SOLUTION INTRAMUSCULAR; INTRAVENOUS AS NEEDED
Status: DISCONTINUED | OUTPATIENT
Start: 2021-09-07 | End: 2021-09-07 | Stop reason: HOSPADM

## 2021-09-07 RX ORDER — ROCURONIUM BROMIDE 10 MG/ML
INJECTION, SOLUTION INTRAVENOUS AS NEEDED
Status: DISCONTINUED | OUTPATIENT
Start: 2021-09-07 | End: 2021-09-07 | Stop reason: HOSPADM

## 2021-09-07 RX ORDER — FACIAL-BODY WIPES
10 EACH TOPICAL DAILY PRN
Status: DISCONTINUED | OUTPATIENT
Start: 2021-09-09 | End: 2021-09-09 | Stop reason: HOSPADM

## 2021-09-07 RX ORDER — POVIDONE-IODINE 10 %
SOLUTION, NON-ORAL TOPICAL AS NEEDED
Status: DISCONTINUED | OUTPATIENT
Start: 2021-09-07 | End: 2021-09-07 | Stop reason: HOSPADM

## 2021-09-07 RX ORDER — LIDOCAINE HYDROCHLORIDE 20 MG/ML
INJECTION, SOLUTION EPIDURAL; INFILTRATION; INTRACAUDAL; PERINEURAL AS NEEDED
Status: DISCONTINUED | OUTPATIENT
Start: 2021-09-07 | End: 2021-09-07 | Stop reason: HOSPADM

## 2021-09-07 RX ORDER — FLUMAZENIL 0.1 MG/ML
0.2 INJECTION INTRAVENOUS
Status: DISCONTINUED | OUTPATIENT
Start: 2021-09-07 | End: 2021-09-07 | Stop reason: HOSPADM

## 2021-09-07 RX ORDER — SUCCINYLCHOLINE CHLORIDE 20 MG/ML
INJECTION INTRAMUSCULAR; INTRAVENOUS AS NEEDED
Status: DISCONTINUED | OUTPATIENT
Start: 2021-09-07 | End: 2021-09-07 | Stop reason: HOSPADM

## 2021-09-07 RX ORDER — NALOXONE HYDROCHLORIDE 0.4 MG/ML
0.2 INJECTION, SOLUTION INTRAMUSCULAR; INTRAVENOUS; SUBCUTANEOUS
Status: DISCONTINUED | OUTPATIENT
Start: 2021-09-07 | End: 2021-09-07 | Stop reason: HOSPADM

## 2021-09-07 RX ORDER — ACETAMINOPHEN 325 MG/1
650 TABLET ORAL
Status: DISCONTINUED | OUTPATIENT
Start: 2021-09-07 | End: 2021-09-09 | Stop reason: HOSPADM

## 2021-09-07 RX ORDER — PROPOFOL 10 MG/ML
INJECTION, EMULSION INTRAVENOUS
Status: DISCONTINUED | OUTPATIENT
Start: 2021-09-07 | End: 2021-09-07 | Stop reason: HOSPADM

## 2021-09-07 RX ORDER — DEXAMETHASONE SODIUM PHOSPHATE 4 MG/ML
INJECTION, SOLUTION INTRA-ARTICULAR; INTRALESIONAL; INTRAMUSCULAR; INTRAVENOUS; SOFT TISSUE AS NEEDED
Status: DISCONTINUED | OUTPATIENT
Start: 2021-09-07 | End: 2021-09-07 | Stop reason: HOSPADM

## 2021-09-07 RX ORDER — MIDAZOLAM HYDROCHLORIDE 1 MG/ML
INJECTION, SOLUTION INTRAMUSCULAR; INTRAVENOUS AS NEEDED
Status: DISCONTINUED | OUTPATIENT
Start: 2021-09-07 | End: 2021-09-07 | Stop reason: HOSPADM

## 2021-09-07 RX ORDER — GABAPENTIN 300 MG/1
300 CAPSULE ORAL
Status: DISCONTINUED | OUTPATIENT
Start: 2021-09-07 | End: 2021-09-09 | Stop reason: HOSPADM

## 2021-09-07 RX ORDER — FAMOTIDINE 20 MG/1
20 TABLET, FILM COATED ORAL 2 TIMES DAILY
Status: DISCONTINUED | OUTPATIENT
Start: 2021-09-07 | End: 2021-09-09 | Stop reason: HOSPADM

## 2021-09-07 RX ORDER — SODIUM CHLORIDE 9 MG/ML
125 INJECTION, SOLUTION INTRAVENOUS CONTINUOUS
Status: DISPENSED | OUTPATIENT
Start: 2021-09-07 | End: 2021-09-08

## 2021-09-07 RX ORDER — NALOXONE HYDROCHLORIDE 0.4 MG/ML
0.4 INJECTION, SOLUTION INTRAMUSCULAR; INTRAVENOUS; SUBCUTANEOUS AS NEEDED
Status: DISCONTINUED | OUTPATIENT
Start: 2021-09-07 | End: 2021-09-09 | Stop reason: HOSPADM

## 2021-09-07 RX ORDER — SODIUM CHLORIDE 0.9 % (FLUSH) 0.9 %
5-40 SYRINGE (ML) INJECTION EVERY 8 HOURS
Status: DISCONTINUED | OUTPATIENT
Start: 2021-09-07 | End: 2021-09-09 | Stop reason: HOSPADM

## 2021-09-07 RX ORDER — VANCOMYCIN HYDROCHLORIDE 1 G/20ML
INJECTION, POWDER, LYOPHILIZED, FOR SOLUTION INTRAVENOUS AS NEEDED
Status: DISCONTINUED | OUTPATIENT
Start: 2021-09-07 | End: 2021-09-07 | Stop reason: HOSPADM

## 2021-09-07 RX ORDER — ONDANSETRON 2 MG/ML
4 INJECTION INTRAMUSCULAR; INTRAVENOUS
Status: ACTIVE | OUTPATIENT
Start: 2021-09-07 | End: 2021-09-08

## 2021-09-07 RX ORDER — ONDANSETRON 2 MG/ML
INJECTION INTRAMUSCULAR; INTRAVENOUS AS NEEDED
Status: DISCONTINUED | OUTPATIENT
Start: 2021-09-07 | End: 2021-09-07 | Stop reason: HOSPADM

## 2021-09-07 RX ORDER — SODIUM CHLORIDE 0.9 % (FLUSH) 0.9 %
5-40 SYRINGE (ML) INJECTION AS NEEDED
Status: DISCONTINUED | OUTPATIENT
Start: 2021-09-07 | End: 2021-09-09 | Stop reason: HOSPADM

## 2021-09-07 RX ORDER — DIPHENHYDRAMINE HYDROCHLORIDE 50 MG/ML
12.5 INJECTION, SOLUTION INTRAMUSCULAR; INTRAVENOUS AS NEEDED
Status: DISCONTINUED | OUTPATIENT
Start: 2021-09-07 | End: 2021-09-07 | Stop reason: HOSPADM

## 2021-09-07 RX ORDER — AMOXICILLIN 250 MG
1 CAPSULE ORAL 2 TIMES DAILY
Status: DISCONTINUED | OUTPATIENT
Start: 2021-09-07 | End: 2021-09-09 | Stop reason: HOSPADM

## 2021-09-07 RX ORDER — PROPOFOL 10 MG/ML
INJECTION, EMULSION INTRAVENOUS AS NEEDED
Status: DISCONTINUED | OUTPATIENT
Start: 2021-09-07 | End: 2021-09-07 | Stop reason: HOSPADM

## 2021-09-07 RX ORDER — SODIUM CHLORIDE, SODIUM LACTATE, POTASSIUM CHLORIDE, CALCIUM CHLORIDE 600; 310; 30; 20 MG/100ML; MG/100ML; MG/100ML; MG/100ML
125 INJECTION, SOLUTION INTRAVENOUS CONTINUOUS
Status: DISCONTINUED | OUTPATIENT
Start: 2021-09-07 | End: 2021-09-07 | Stop reason: HOSPADM

## 2021-09-07 RX ORDER — LIDOCAINE HYDROCHLORIDE 10 MG/ML
0.1 INJECTION, SOLUTION EPIDURAL; INFILTRATION; INTRACAUDAL; PERINEURAL AS NEEDED
Status: DISCONTINUED | OUTPATIENT
Start: 2021-09-07 | End: 2021-09-07 | Stop reason: HOSPADM

## 2021-09-07 RX ORDER — DIPHENHYDRAMINE HYDROCHLORIDE 50 MG/ML
12.5 INJECTION, SOLUTION INTRAMUSCULAR; INTRAVENOUS
Status: DISCONTINUED | OUTPATIENT
Start: 2021-09-07 | End: 2021-09-09 | Stop reason: HOSPADM

## 2021-09-07 RX ORDER — HYDROMORPHONE HCL/0.9% NACL/PF 0.5 MG/ML
PLASTIC BAG, INJECTION (ML) INTRAVENOUS
Status: DISPENSED | OUTPATIENT
Start: 2021-09-07 | End: 2021-09-08

## 2021-09-07 RX ORDER — CYCLOBENZAPRINE HCL 10 MG
10 TABLET ORAL
Status: DISCONTINUED | OUTPATIENT
Start: 2021-09-07 | End: 2021-09-09

## 2021-09-07 RX ORDER — POLYETHYLENE GLYCOL 3350 17 G/17G
17 POWDER, FOR SOLUTION ORAL DAILY
Status: DISCONTINUED | OUTPATIENT
Start: 2021-09-08 | End: 2021-09-09 | Stop reason: HOSPADM

## 2021-09-07 RX ORDER — OXYCODONE HYDROCHLORIDE 5 MG/1
10 TABLET ORAL
Status: DISCONTINUED | OUTPATIENT
Start: 2021-09-07 | End: 2021-09-08

## 2021-09-07 RX ORDER — HYDROMORPHONE HYDROCHLORIDE 1 MG/ML
0.5 INJECTION, SOLUTION INTRAMUSCULAR; INTRAVENOUS; SUBCUTANEOUS
Status: ACTIVE | OUTPATIENT
Start: 2021-09-07 | End: 2021-09-08

## 2021-09-07 RX ORDER — HYDROMORPHONE HYDROCHLORIDE 1 MG/ML
.25-1 INJECTION, SOLUTION INTRAMUSCULAR; INTRAVENOUS; SUBCUTANEOUS
Status: DISCONTINUED | OUTPATIENT
Start: 2021-09-07 | End: 2021-09-07 | Stop reason: HOSPADM

## 2021-09-07 RX ORDER — OXYCODONE HYDROCHLORIDE 5 MG/1
5 TABLET ORAL
Status: DISCONTINUED | OUTPATIENT
Start: 2021-09-07 | End: 2021-09-08

## 2021-09-07 RX ADMIN — PROPOFOL 200 MG: 10 INJECTION, EMULSION INTRAVENOUS at 13:43

## 2021-09-07 RX ADMIN — PROPOFOL 100 MCG/KG/MIN: 10 INJECTION, EMULSION INTRAVENOUS at 13:49

## 2021-09-07 RX ADMIN — CYCLOBENZAPRINE 10 MG: 10 TABLET, FILM COATED ORAL at 23:05

## 2021-09-07 RX ADMIN — FAMOTIDINE 20 MG: 20 TABLET ORAL at 20:45

## 2021-09-07 RX ADMIN — SODIUM CHLORIDE, POTASSIUM CHLORIDE, SODIUM LACTATE AND CALCIUM CHLORIDE 125 ML/HR: 600; 310; 30; 20 INJECTION, SOLUTION INTRAVENOUS at 12:00

## 2021-09-07 RX ADMIN — DOCUSATE SODIUM 50MG AND SENNOSIDES 8.6MG 1 TABLET: 8.6; 5 TABLET, FILM COATED ORAL at 20:45

## 2021-09-07 RX ADMIN — FENTANYL CITRATE 50 MCG: 50 INJECTION, SOLUTION INTRAMUSCULAR; INTRAVENOUS at 16:15

## 2021-09-07 RX ADMIN — Medication: at 17:19

## 2021-09-07 RX ADMIN — DEXAMETHASONE SODIUM PHOSPHATE 4 MG: 4 INJECTION, SOLUTION INTRAMUSCULAR; INTRAVENOUS at 14:03

## 2021-09-07 RX ADMIN — SODIUM CHLORIDE 125 ML/HR: 9 INJECTION, SOLUTION INTRAVENOUS at 17:09

## 2021-09-07 RX ADMIN — ONDANSETRON HYDROCHLORIDE 4 MG: 2 SOLUTION INTRAMUSCULAR; INTRAVENOUS at 15:58

## 2021-09-07 RX ADMIN — HYDROMORPHONE HYDROCHLORIDE 1 MG: 1 INJECTION, SOLUTION INTRAMUSCULAR; INTRAVENOUS; SUBCUTANEOUS at 16:45

## 2021-09-07 RX ADMIN — ROCURONIUM BROMIDE 5 MG: 10 INJECTION INTRAVENOUS at 13:43

## 2021-09-07 RX ADMIN — GABAPENTIN 300 MG: 300 CAPSULE ORAL at 23:05

## 2021-09-07 RX ADMIN — CEFAZOLIN SODIUM 2 G: 1 POWDER, FOR SOLUTION INTRAMUSCULAR; INTRAVENOUS at 14:02

## 2021-09-07 RX ADMIN — FENTANYL CITRATE 100 MCG: 50 INJECTION, SOLUTION INTRAMUSCULAR; INTRAVENOUS at 13:43

## 2021-09-07 RX ADMIN — DIPHENHYDRAMINE HYDROCHLORIDE 12.5 MG: 50 INJECTION, SOLUTION INTRAMUSCULAR; INTRAVENOUS at 17:48

## 2021-09-07 RX ADMIN — LIDOCAINE HYDROCHLORIDE 80 MG: 20 INJECTION, SOLUTION EPIDURAL; INFILTRATION; INTRACAUDAL; PERINEURAL at 13:43

## 2021-09-07 RX ADMIN — FENTANYL CITRATE 50 MCG: 50 INJECTION, SOLUTION INTRAMUSCULAR; INTRAVENOUS at 14:30

## 2021-09-07 RX ADMIN — SUCCINYLCHOLINE CHLORIDE 120 MG: 20 INJECTION, SOLUTION INTRAMUSCULAR; INTRAVENOUS at 13:44

## 2021-09-07 RX ADMIN — CEFAZOLIN 2 G: 1 INJECTION, POWDER, FOR SOLUTION INTRAMUSCULAR; INTRAVENOUS at 20:45

## 2021-09-07 RX ADMIN — MIDAZOLAM 2 MG: 1 INJECTION, SOLUTION INTRAMUSCULAR; INTRAVENOUS at 13:36

## 2021-09-07 RX ADMIN — HYDROMORPHONE HYDROCHLORIDE 1 MG: 1 INJECTION, SOLUTION INTRAMUSCULAR; INTRAVENOUS; SUBCUTANEOUS at 17:01

## 2021-09-07 NOTE — ANESTHESIA POSTPROCEDURE EVALUATION
Procedure(s):  L4-5 LAMINECTOMY/FUSION WITH INSTRUMENTATION/TLIF/BONE GRAFT. general    Anesthesia Post Evaluation        Patient location during evaluation: PACU  Level of consciousness: awake  Pain management: adequate  Airway patency: patent  Anesthetic complications: no  Cardiovascular status: acceptable  Respiratory status: acceptable  Hydration status: acceptable  Post anesthesia nausea and vomiting:  none      INITIAL Post-op Vital signs:   Vitals Value Taken Time   /72 09/07/21 1740   Temp 36.8 °C (98.2 °F) 09/07/21 1635   Pulse 96 09/07/21 1744   Resp 12 09/07/21 1744   SpO2 94 % 09/07/21 1744   Vitals shown include unvalidated device data.

## 2021-09-07 NOTE — H&P
Date of Surgery Update:  Sinai Mane was seen and examined. History and physical has been reviewed. The patient has been examined.  There have been no significant clinical changes since the completion of the originally dated History and Physical.    Signed By: Carter Delgado MD     September 7, 2021 12:29 PM

## 2021-09-07 NOTE — OP NOTES
[address]  [address], [address] [facility]    OPERATIVE REPORT      NAME: Sinai Mane    AGE: 48 y.o. YOB: 1970    MEDICAL RECORD NUMBER: [id number]    DATE OF SURGERY: 9/7/2021    PREOPERATIVE DIAGNOSES:  1. Lumbar stenosis. 2. Acquired lumbar spondylolisthesis. POSTOPERATIVE DIAGNOSES:  1. Lumbar stenosis. 2. Acquired lumbar spondylolisthesis. OPERATIVE PROCEDURES:   1. Laminectomy, partial facetectomy, and foraminotomy of L5.   2. Laminectomy, partial facetectomy, and foraminotomy of L4.   3. Posterolateral fusion and posterior lumbar interbody fusion of L4-5. 4. Pedicle screw instrumentation with RTI pedicle screws for L4 and L5 bilaterally. 5. Application of biomechanical RTI intervertebral body device at L4-5 with RTI. 6. Morselized allograft for spine surgery and local autograft for spine surgery with stem cells. SURGEON: Brijesh Vargas MD     ASSISTANT: NADIR Aparicio    Specimens - none    Implants - see above    ANESTHESIA: General    ESTIMATED BLOOD LOSS: 114 cc    COMPLICATIONS: None apparent    NEUROMONITORING: We used SSEPs and spontaneous EMGs with pedicle screw stimulation    INDICATION: The patient is a very pleasant 48 y.o. male with debilitating leg pain and back pain. He failed conservative measures. He elected to proceed with operative intervention. He was aware of the risks, benefits, and alternatives. He provided informed consent. DESCRIPTION OF PROCEDURE: The patient was identified in the preoperative holding area. The lumbar spine was marked by me. He was transferred to the operating room where general anesthesia was given. He was also given perioperative ancef ntibiotics. He was placed prone on the [facility] table. All bony prominences were well padded. The lumbar spine was prepped and draped in the usual standard fashion. We performed a surgical time out. I made a skin incision from L3 to L5. I exposed the posterior lumbar spine in standard fashion. I took intraoperative fluoroscopy to verify our levels. I exposed the transverse processes of L4 and L5 bilaterally. I then began my decompression by performing a laminectomy of L4. I also performed a partial facetectomy and foraminotomy to decompress the thecal sac and nerve roots of L4. I also performed a laminectomy of L5 with bilateral partial facetectomy and foraminotomy to decompress the thecal sac and traversing L5 nerve roots. I decompressed the stenosis found within the foramen and lateral to the foramen for L4-L5 on the left side. At this point our transforaminal approach to L4-L5 on the left side was complete with exposure of the left L4-5 disc space. I then performed a standard discectomy at L4-5. I prepared the endplates to bleeding bone. I performed trial sizing. I placed a biomechanical device into L4-5 with the appropriate amount of tension and alignment. I placed bone graft. I then cannulated our pedicles for L4 and L5 bilaterally in standard fashion. I probed each pedicle. I copiously irrigated the entire wound. I decorticated our fusion beds bilaterally with a high-speed mihir. I placed our bone graft into our fusions beds bilaterally. I then placed pedicle screws for L4 and L5 bilaterally in standard fashion under fluoroscopic guidance. I had good purchase for each pedicle. I then stimulated all 4 pedicle screws with pedicle screw stimulation. The pedicle screws were found to be within the appropriate range of amplitude. I then placed contoured rods on top of the pedicles bilaterally. The rods were locked to the pedicle screws according to the 's specification with set screws. We had good hemostasis. There was no CSF leaking. The fusion beds were packed with morselized allograft and local autograft. The exposed neurologic elements were protected with Duragen.  I injected the soft tissues with a pain management cocktail. A deep drain was placed. The wound was closed in layers. A sterile dressing was applied. The patient was extubated and transferred to the recovery room in good medical condition. The PA assisted with retraction and closure. I, Dr. Sonya Mendez, performed the above procedures.      Sonya Mendez MD  9/7/2021

## 2021-09-08 LAB
ANION GAP SERPL CALC-SCNC: 3 MMOL/L (ref 5–15)
BUN SERPL-MCNC: 11 MG/DL (ref 6–20)
BUN/CREAT SERPL: 14 (ref 12–20)
CALCIUM SERPL-MCNC: 7.7 MG/DL (ref 8.5–10.1)
CHLORIDE SERPL-SCNC: 109 MMOL/L (ref 97–108)
CO2 SERPL-SCNC: 27 MMOL/L (ref 21–32)
CREAT SERPL-MCNC: 0.77 MG/DL (ref 0.7–1.3)
GLUCOSE SERPL-MCNC: 114 MG/DL (ref 65–100)
HGB BLD-MCNC: 12.3 G/DL (ref 12.1–17)
POTASSIUM SERPL-SCNC: 3.6 MMOL/L (ref 3.5–5.1)
SODIUM SERPL-SCNC: 139 MMOL/L (ref 136–145)

## 2021-09-08 PROCEDURE — 97535 SELF CARE MNGMENT TRAINING: CPT

## 2021-09-08 PROCEDURE — 2709999900 HC NON-CHARGEABLE SUPPLY

## 2021-09-08 PROCEDURE — 74011000250 HC RX REV CODE- 250: Performed by: ORTHOPAEDIC SURGERY

## 2021-09-08 PROCEDURE — 74011250636 HC RX REV CODE- 250/636: Performed by: ORTHOPAEDIC SURGERY

## 2021-09-08 PROCEDURE — 74011250637 HC RX REV CODE- 250/637: Performed by: PHYSICIAN ASSISTANT

## 2021-09-08 PROCEDURE — 99218 HC RM OBSERVATION: CPT

## 2021-09-08 PROCEDURE — 97165 OT EVAL LOW COMPLEX 30 MIN: CPT

## 2021-09-08 PROCEDURE — 97162 PT EVAL MOD COMPLEX 30 MIN: CPT

## 2021-09-08 PROCEDURE — 36415 COLL VENOUS BLD VENIPUNCTURE: CPT

## 2021-09-08 PROCEDURE — 97530 THERAPEUTIC ACTIVITIES: CPT

## 2021-09-08 PROCEDURE — 85018 HEMOGLOBIN: CPT

## 2021-09-08 PROCEDURE — 74011250637 HC RX REV CODE- 250/637: Performed by: ORTHOPAEDIC SURGERY

## 2021-09-08 PROCEDURE — 80048 BASIC METABOLIC PNL TOTAL CA: CPT

## 2021-09-08 PROCEDURE — 97116 GAIT TRAINING THERAPY: CPT

## 2021-09-08 RX ORDER — HYDROMORPHONE HYDROCHLORIDE 2 MG/1
2 TABLET ORAL
Status: DISCONTINUED | OUTPATIENT
Start: 2021-09-08 | End: 2021-09-09 | Stop reason: HOSPADM

## 2021-09-08 RX ORDER — HYDROMORPHONE HYDROCHLORIDE 2 MG/1
4 TABLET ORAL
Status: DISCONTINUED | OUTPATIENT
Start: 2021-09-08 | End: 2021-09-09 | Stop reason: HOSPADM

## 2021-09-08 RX ADMIN — Medication 10 ML: at 21:02

## 2021-09-08 RX ADMIN — GABAPENTIN 300 MG: 300 CAPSULE ORAL at 21:01

## 2021-09-08 RX ADMIN — CYCLOBENZAPRINE 10 MG: 10 TABLET, FILM COATED ORAL at 07:14

## 2021-09-08 RX ADMIN — FAMOTIDINE 20 MG: 20 TABLET ORAL at 17:45

## 2021-09-08 RX ADMIN — CYCLOBENZAPRINE 10 MG: 10 TABLET, FILM COATED ORAL at 21:00

## 2021-09-08 RX ADMIN — CEFAZOLIN 2 G: 1 INJECTION, POWDER, FOR SOLUTION INTRAMUSCULAR; INTRAVENOUS at 04:04

## 2021-09-08 RX ADMIN — HYDROMORPHONE HYDROCHLORIDE 4 MG: 2 TABLET ORAL at 22:23

## 2021-09-08 RX ADMIN — POLYETHYLENE GLYCOL 3350 17 G: 17 POWDER, FOR SOLUTION ORAL at 09:08

## 2021-09-08 RX ADMIN — HYDROMORPHONE HYDROCHLORIDE 4 MG: 2 TABLET ORAL at 18:30

## 2021-09-08 RX ADMIN — FAMOTIDINE 20 MG: 20 TABLET ORAL at 09:08

## 2021-09-08 RX ADMIN — Medication 10 ML: at 14:03

## 2021-09-08 RX ADMIN — CYCLOBENZAPRINE 10 MG: 10 TABLET, FILM COATED ORAL at 16:28

## 2021-09-08 RX ADMIN — DOCUSATE SODIUM 50MG AND SENNOSIDES 8.6MG 1 TABLET: 8.6; 5 TABLET, FILM COATED ORAL at 17:45

## 2021-09-08 RX ADMIN — CEFAZOLIN 2 G: 1 INJECTION, POWDER, FOR SOLUTION INTRAMUSCULAR; INTRAVENOUS at 12:39

## 2021-09-08 RX ADMIN — DOCUSATE SODIUM 50MG AND SENNOSIDES 8.6MG 1 TABLET: 8.6; 5 TABLET, FILM COATED ORAL at 09:08

## 2021-09-08 RX ADMIN — HYDROMORPHONE HYDROCHLORIDE 2 MG: 2 TABLET ORAL at 12:39

## 2021-09-08 NOTE — PROGRESS NOTES
Problem: Self Care Deficits Care Plan (Adult)  Goal: *Acute Goals and Plan of Care (Insert Text)  Description:    FUNCTIONAL STATUS PRIOR TO ADMISSION: Patient was independent and active without use of DME. Patient was independent for basic and instrumental ADLs. HOME SUPPORT: The patient lived with wife and children but did not require assist.    Occupational Therapy Goals  Initiated 9/8/2021    1. Patient will perform lower body dressing with modified independence using AE PRN within 7 days. 2.  Patient will perform toileting and toilet transfer with modified independence using most appropriate DME within 7 days. 3.  Patient will perform grooming at modified independence, standing at sink, within 7 days. 4.  Patient will don/doff back brace at modified independence within 7 days. 5.  Patient will verbalize/demonstrate 3/3 back precautions during ADL tasks without cues within 7 days. Outcome: Progressing Towards Goal     OCCUPATIONAL THERAPY EVALUATION  Patient: Michael Linder (14 y.o. male)  Date: 9/8/2021  Primary Diagnosis: Spondylolisthesis, lumbar region [M43.16]  Procedure(s) (LRB):  L4-5 LAMINECTOMY/FUSION WITH INSTRUMENTATION/TLIF/BONE GRAFT (N/A) 1 Day Post-Op   Precautions: Fall; Back       ASSESSMENT  Based on the objective data described below, the patient presents with decreased activity tolerance and elevated pain on POD 1 s/p L4-5 laminectomy. Pt is received OOB in chair wit supportive wife present. Pt today able to participate in AE training for LB dressing tasks and demonstrates good carry-over of techniques. He can tailor sit with effort but will likely benefit from AE to pain control. He demonstrates mgmt of LSO with min A and simulates good reach for bowel hygiene this session prior to returning to supine. He requires increased cueing for safe hand placement during transfers but otherwise mobilizes with CGA to min A.  Anticipate only 1-2 additional OT sessions needed in acute setting. Current Level of Function Impacting Discharge (ADLs/self-care): up to min A for ADLs    Functional Outcome Measure: The patient scored 45/100 on the Barthel outcome measure which is indicative of minimal functional impairment. Other factors to consider for discharge: supportive wife; back precautions; indep PLOF     Patient will benefit from skilled therapy intervention to address the above noted impairments. PLAN :  Recommendations and Planned Interventions: self care training, functional mobility training, therapeutic exercise, balance training, therapeutic activities, endurance activities, patient education, home safety training and family training/education    Frequency/Duration: Patient will be followed by occupational therapy 5 times a week to address goals. Recommendation for discharge: (in order for the patient to meet his/her long term goals)  No skilled occupational therapy/ follow up rehabilitation needs identified at this time. This discharge recommendation:  Has been made in collaboration with the attending provider and/or case management    IF patient discharges home will need the following DME: [facility] AE       SUBJECTIVE:   Patient stated Allie Willett, this feels better.  -during use of [facility] AE    OBJECTIVE DATA SUMMARY:   HISTORY:   Past Medical History:   Diagnosis Date    Adverse effect of anesthesia     facial itchin upon waking up; resolved with benedryl    Chronic low back pain     COVID-19 01/2021    COVID-19 vaccine series completed 2021    Hypercholesterolemia      Past Surgical History:   Procedure Laterality Date    HX KNEE ARTHROSCOPY Bilateral     HX LYMPH NODE DISSECTION Left 2000    HX ROTATOR CUFF REPAIR Right        Expanded or extensive additional review of patient history:     Home Situation  Home Environment: Private residence  # Steps to Enter: 5  Rails to Enter: Yes  Hand Rails : Bilateral  One/Two Story Residence: Two story  # of Interior Steps: [address]: Right  Lift Chair Available: No  Living Alone: No  Support Systems: Spouse/Significant Other  Patient Expects to be Discharged to[de-identified] House  Current DME Used/Available at Home: Brace/Splint, Walker  Tub or Shower Type: Tub/Shower combination    Hand dominance: Right    EXAMINATION OF PERFORMANCE DEFICITS:  Cognitive/Behavioral Status:  Neurologic State: Alert  Orientation Level: Oriented X4  Cognition: Appropriate decision making; Appropriate for age attention/concentration; Appropriate safety awareness; Follows commands  Perception: Appears intact  Perseveration: No perseveration noted  Safety/Judgement: Awareness of environment; Fall prevention;Good awareness of safety precautions; Home safety; Insight into deficits    Hearing: Auditory  Auditory Impairment: None    Range of Motion:  AROM: Within functional limits    Strength:  Strength: Generally decreased, functional    Coordination:  Coordination: Within functional limits  Fine Motor Skills-Upper: Left Intact; Right Intact    Gross Motor Skills-Upper: Left Intact; Right Intact    Tone & Sensation  Tone: Normal  Sensation: Intact    Balance:  Sitting: Without support  Sitting - Static: Good (unsupported)  Sitting - Dynamic: Good (unsupported)  Standing: Intact; With support    Functional Mobility and Transfers for ADLs:  Bed Mobility:  Rolling: Minimum assistance  Supine to Sit:  (pt received OOB in chair)  Sit to Supine: Minimum assistance  Scooting: Contact guard assistance    Transfers:  Sit to Stand: Contact guard assistance;Minimum assistance  Stand to Sit: Contact guard assistance  Bed to Chair: Contact guard assistance  Toilet Transfer : Contact guard assistance (infer based on observations)    ADL Assessment:  Feeding: Independent    Oral Facial Hygiene/Grooming: Setup;Supervision (seated; infer up to CGA standing)    Bathing: Contact guard assistance;Minimum assistance    Upper Body Dressing: Setup;Minimum assistance (up to min A for LSO mgmt)    Lower Body Dressing: Minimum assistance    Toileting: Contact guard assistance    ADL Intervention and task modifications:  Patient instructed and demonstrated 3/3 back precautions with verbal cues. Upper Body Dressing Assistance  Orthotics(Brace): Minimum assistance (for LSO brace)  Cues: Verbal cues provided    Lower Body Dressing Assistance  Underpants: Contact guard assistance;Minimum assistance; Compensatory technique training (with reacher)  Socks: Supervision;Stand-by assistance; Compensatory technique training (don/doff R w/o AE; don L with sock aid)  Leg Crossed Method Used: Yes  Position Performed: Seated edge of bed  Cues: Don;Doff;Physical assistance; Tactile cues provided;Verbal cues provided;Visual cues provided  Adaptive Equipment Used: Reacher;Sock aid; Walker    Toileting  Bowel Hygiene: Compensatory technique training (pt able to simulate reach for bowel hygiene while standing)  Cues: Verbal cues provided    Cognitive Retraining  Safety/Judgement: Awareness of environment; Fall prevention;Good awareness of safety precautions; Home safety; Insight into deficits    Patient instructed and indicated understanding the benefits of maintaining activity tolerance, functional mobility, and independence with self care tasks during acute stay  to ensure safe return home and to baseline. Encouraged patient to increase frequency and duration OOB, not sitting longer than 30 mins without marching/walking with staff, be out of bed for all meals, perform daily ADLs (as approved by RN/MD regarding bathing etc), and performing functional mobility to/from bathroom. Patient instruction and indicated understanding on body mechanics, ergonomics and gravitational force on the spine during different body positions to plan activities in prep for return home to complete basic ADLs, instrumental ADLs and back to work safely.    Dressing brace: Patient instructed and demonstrated to don/doff velcro on brace using dominant side, keeping non-dominant side intact. Patient instructed and demonstrated in meantime of being able to stand with back against wall to don/doff brace, to don/doff seated using lap and bed/chair surface to support brace while manipulating. Dressing lower body: Patient instructed to don brace first and on the benefits to remain seated to don all clothing to increase independence with precautions and pain management. Patient instructed and demonstrated tailor sitting for lower body dressing with CGA. Toileting: Patient instructed on the benefits of using flushable wet wipes and toilet tongs if decreased reach or pain for louise care. Also, the benefits of a reacher to aid in clothing management. Patient instruction and indicated understanding to not strain i.e. holding breath to bear down during a bowel movement, lifting/activity, and sexual activity. Home safety: Patient instructed and indicated understanding on home modifications and safety [raise height of ADL objects (i.e. clothing, sink items, fridge items, items to mouth when grooming), appropriate height of chair surfaces, recliner safety, change of floor surfaces, clear pathways] to increase independence and fall prevention. Standing: Patient instructed and indicated understanding to walk up to sink/counter top/surfaces, step into walker, square off while using objects, slide objects along surfaces, to increase adherence to back precautions and fall prevention. Patient instructed to increase amount of time standing in order to increase independence and tolerance with ADLs. During prolonged standing, can open cabinet door or place foot on stool to decrease spinal pressure/increase pain. Functional Measure:  Barthel Index:    Bathin  Bladder: 5  Bowels: 5  Groomin  Dressin  Feeding: 10  Mobility: 0  Stairs: 0  Toilet Use: 5  Transfer (Bed to Chair and Back): 10  Total: 45/100        The Barthel ADL Index: Guidelines  1.  The index should be used as a record of what a patient does, not as a record of what a patient could do. 2. The main aim is to establish degree of independence from any help, physical or verbal, however minor and for whatever reason. 3. The need for supervision renders the patient not independent. 4. A patient's performance should be established using the best available evidence. Asking the patient, friends/relatives and nurses are the usual sources, but direct observation and common sense are also important. However direct testing is not needed. 5. Usually the patient's performance over the preceding 24-48 hours is important, but occasionally longer periods will be relevant. 6. Middle categories imply that the patient supplies over 50 per cent of the effort. 7. Use of aids to be independent is allowed. Ronni Ferraro., Barthel, D.W. (6758). Functional evaluation: the Barthel Index. [address] [facility] (14)2. [place] [place] jaqueline LURDES Reyes, Mali Orozco., Miles Lazo., [place], [address] [place] [address] (1999). Measuring the change indisability after inpatient rehabilitation; comparison of the responsiveness of the Barthel Index and Functional [place] Measure. Journal of Neurology, Neurosurgery, and Psychiatry, 66(4), 976-012. Arely James, N.J.A, CHICHI Lewis.NIKOLAS, & Jacquelyn Mac MPURVI. (2004.) Assessment of post-stroke quality of life in cost-effectiveness studies: The usefulness of the Barthel Index and the EuroQoL-5D.  Quality of Life Research, 15, 646-60     Occupational Therapy Evaluation Charge Determination   History Examination Decision-Making   LOW Complexity : Brief history review  LOW Complexity : 1-3 performance deficits relating to physical, cognitive , or psychosocial skils that result in activity limitations and / or participation restrictions  LOW Complexity : No comorbidities that affect functional and no verbal or physical assistance needed to complete eval tasks       Based on the above components, the patient evaluation is determined to be of the following complexity level: LOW   Pain Rating:  Pt reporting moderate pain    Activity Tolerance:   Good and Fair    After treatment patient left in no apparent distress:    Patient positioned in R sidelying for pressure relief, Call bell within reach, Bed / chair alarm activated, Caregiver / family present and Side rails x 3    COMMUNICATION/EDUCATION:   The patients plan of care was discussed with: Physical therapist and Registered nurse. Home safety education was provided and the patient/caregiver indicated understanding., Patient/family have participated as able in goal setting and plan of care. and Patient/family agree to work toward stated goals and plan of care. This patients plan of care is appropriate for delegation to [facility].     Thank you for this referral.  Peter Diaz, OT  Time Calculation: 35 mins

## 2021-09-08 NOTE — PERIOP NOTES
Report received from [facility], pt care assumed at this time,  Pt pleasant, awake, denies pain, denies need for any additional medication, questions/concerns addressed regarding PCA, VSS, pt taking PO, licea draining sufficient quantities, call light in reach, will monitor. 2300 - Pt rang out requesting \"a muscle relaxer to help me get comfortable\", encouraged pt to use PCA PRN, pt verbalized understanding, medicated as per MAR, pt taking PO well, snacking, needs denied. 0050 - Pt resting soundly, RR easy and unlabored, no pain cues noted. Will monitor. 0230 - Report to [facility] RN, pt care transferred at this time.

## 2021-09-08 NOTE — PROGRESS NOTES
9/8/2021     11:58 AM  CM received acceptance from [facility] for [facility] services. 11:27 AM  Reason for Admission: Elective - Radiculitis. Surgery required. Assessment:   [x]In person with pt   []Via p/c with pt   []With family member in person. Who/Relation:     []With family member via p/c. Who/Relation:   []Chart Review    RUR:  NA - OBS  Risk Level: [x]Low []Moderate []High  Value-based purchasing: [] Yes [x] No  Bundle patient: [] Yes [x] No   Specify:     Advance Directive: No Order. [x] No AD on file. [] AD on file. [] Current AD not on file. Copy requested. [] Requests AD, and referral submitted to [facility]. Healthcare Decision Maker:  Jayleen Marte - Wife        Assessment:    Age: 48    Sex: [x] Male []Female     Residency: [x]Private residence []Apartment []Assisted Living []LTC []Other:   Exterior Steps: 4  Interior Steps: 1 Flight    Lives With: [x]With spouse []Other family members []Underage children []Alone []Care provider []Other:    Prior functioning:  [x]Independent with ADLs and iADLS []Dependent with ADLs and iADLs []Partial dependence, Specify:     Prior DME required:  [x]None []RW []Cane []Crutches []Bedside commode []CPAP []Home O2 (Liter/Provider: ) []Nebulizer   []Shower Chair []Wheelchair []Hospital Bed []Suresh []Stair lift []Rollator []Other:    DME available: [x]None []RW []Cane []Crutches []Bedside commode []CPAP []Home O2 (Liter/Provider: ) []Nebulizer   []Shower Chair []Wheelchair []Hospital Bed []Suresh []Stair lift []Rollator []Other:    Rehab history: [x]None []Outpatient PT []Home Health (Provider/Date: ) []SNF (Provider/Date: ) []IPR (Provider/Date: ) []LTC (Provider/Date: ) []Hospice (Provider/Date: )  []Other:     Discharge Concerns: []Yes [x]No []Unknown   Describe:    Comments:      Insurer:   [id number] [facility] Phone:     Subscriber: Mariia Haque Subscriber#: [id number]    Group#: [id number] Precert#: Observation notice provided in writing to patient and/or caregiver as well as verbal explanation of the policy. Patients who are in outpatient status also receive the Observation notice. Patient has received notice and or patient representative has received via secure email, fax, or certified mail based on patient representative's preference. Pt did not sign. PCP: Maxi Werner   Address: [address] [place] [address] [address] [address]   Phone number: [phone]   Current patient: [x]Yes []No   Approximate date of last visit: 02/2021   Access to virtual PCP visits: []Yes [x]No    Pharmacy:  [facility] Transport: Family       Transition of care plan:    []Unable to determine at this time. Awaiting clinical progress, and disposition recommendations. [] Home with outpatient follow-up    [] Home with Outpatient PT and outpatient follow-up   Pt aware of OP appt? []Yes, Provider:   []Not scheduled   Transport provider:     [] Home with family assistance as needed and outpatient follow-up   Family able to assist:    Schedule:  Transport provider:      [x] Home with Home Health   - Provider:  CM consult for [place] noted. Pt indicated At [address] as preference. CM completed referral via "[employer], and is awaiting response. []SNF/IPR   -[]Preferences given:   []Listing provided and preferences requested   -Status: []Pending []Accepted:    -Auth required: []Yes []No    -Auth initiated date:   -3 midnight stay required: []Yes []No  Date satisfied:     [] Home with Hospice   -Provider:     [] [facility] information provided. [] Other:     Esmer Logan MA    Care Management Interventions  PCP Verified by CM: Yes Bernardo Gallardo)  Mode of Transport at Discharge:  Other (see comment) (Family)  Transition of Care Consult (CM Consult): Discharge Planning, [address]: No  Reason Outside [place]: Physician referred to specific agency  MyChart Signup: No  Physical Therapy Consult: Yes  Occupational Therapy Consult: Yes  Speech Therapy Consult: No  Support Systems: Spouse/Significant Other  Confirm Follow Up Transport: Family  The Plan for Transition of Care is Related to the Following Treatment Goals : Lumbar  The Patient and/or Patient Representative was Provided with a Choice of Provider and Agrees with the Discharge Plan?: (S) Yes  Name of the Patient Representative Who was Provided with a Choice of Provider and Agrees with the Discharge Plan: Self  Freedom of Choice List was Provided with Basic Dialogue that Supports the Patient's Individualized Plan of Care/Goals, Treatment Preferences and Shares the Quality Data Associated with the Providers?: (S) Yes  Discharge Location  Discharge Placement: Home with home health

## 2021-09-08 NOTE — PROGRESS NOTES
Problem: Mobility Impaired (Adult and Pediatric)  Goal: *Therapy Goal (Edit Goal, Insert Text)  Description: FUNCTIONAL STATUS PRIOR TO ADMISSION: Indep        HOME SUPPORT PRIOR TO ADMISSION: The patient lived alone with no local support. Physical Therapy Goals  Initiated 9/8/2021  1. Patient will move from supine to sit and sit to supine  in bed with independence within 7 day(s). 2.  Patient will transfer from bed to chair and chair to bed with modified independence using the least restrictive device within 7 day(s). 3.  Patient will perform sit to stand with modified independence within 7 day(s). 4.  Patient will ambulate with modified independence for 300 feet with the least restrictive device within 7 day(s). 5.  Patient will ascend/descend 5 stairs with bilatraleral handrail(s) with supervision/set-up within 7 day(s). 9/8/2021 1208 by Colby Gastelum PT  Outcome: Progressing Towards Goal  Note:   PHYSICAL THERAPY EVALUATION  Patient: Marta John (63 y.o. male)  Date: 9/8/2021  Primary Diagnosis: Spondylolisthesis, lumbar region [M43.16]  Procedure(s) (LRB):  L4-5 LAMINECTOMY/FUSION WITH INSTRUMENTATION/TLIF/BONE GRAFT (N/A) 1 Day Post-Op   Precautions:spinal, brace when OOB      ASSESSMENT  Based on the objective data described below, the patient presents with bilateral LE weakness following surgery. Brace is in place and he required instruction donning and doffing. Wife was present for eval and reported understanding of brace don/ doff as well as guarding precautions at this time. She reports that she is purchasing recliner for return to home with him. 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 They have 2 children under age of five at home, but wife states she will manage them during his recovery. Pt was educated on log roll for supine to sit but needed several verbal cues and reminders. Pt is able to sit to stand contact guard with walker.  He ambulated to chair and reports minimal pain but required several rest breaks to get from supine to the chair    Current Level of Function Impacting Discharge (mobility/balance): poor balance some confusion with bracing and precations    Functional Outcome Measure: The patient scored 30 on the Barthel outcome measure which is indicative of fall risk and need for assistance at home with ADLs. Other factors to consider for discharge: 2 story home and 5 steps to enter     Patient will benefit from skilled therapy intervention to address the above noted impairments. PLAN :  Recommendations and Planned Interventions: bed mobility training, transfer training, gait training, therapeutic exercises, orthotic/prosthetic training, and therapeutic activities      Frequency/Duration: Patient will be followed by physical therapy:  5 times a week to address goals.     Recommendation for discharge: (in order for the patient to meet his/her long term goals)  Physical therapy at least 2 days/week in the home AND ensure assist and/or supervision for safety with home with wife    This discharge recommendation:  Has been made in collaboration with the attending provider and/or case management    IF patient discharges home will need the following DME: walker         SUBJECTIVE:    Pt had wife present for eval. \"Really want to go home but there is a burning sensation down my spine\"  OBJECTIVE DATA SUMMARY:   HISTORY:    Past Medical History:   Diagnosis Date    Adverse effect of anesthesia     facial itchin upon waking up; resolved with benedryl    Chronic low back pain     COVID-19 01/2021    COVID-19 vaccine series completed 2021    Hypercholesterolemia      Past Surgical History:   Procedure Laterality Date    HX KNEE ARTHROSCOPY Bilateral     HX LYMPH NODE DISSECTION Left 2000    HX ROTATOR CUFF REPAIR Right        Personal factors and/or comorbidities impacting plan of care:     Home Situation  Home Environment: Private residence  # Steps to Enter: 5  Rails to Enter: Yes  Hand Rails : Bilateral  One/Two Story Residence: Two story  # of Interior Steps: 15  Interior Rails: Right  Lift Chair Available: No  Living Alone: No  Support Systems: Spouse/Significant Other  Patient Expects to be Discharged to[de-identified] [employer]  Current DME Used/Available at Home: Brace/Splint, Walker  Tub or Shower Type: Tub/Shower combination    EXAMINATION/PRESENTATION/DECISION MAKING:   Critical Behavior:  Neurologic State: Alert  Orientation Level: Oriented to time, Oriented to situation, Oriented to place, Oriented to person  Cognition: Follows commands     Hearing:     Skin:    Edema: none noted  Range Of Motion:     RLE Assessment (WDL): Within defined limits              LLE Assessment (WDL): Within defined limits     Strength:          RLE Assessment (WDL): Within defined limits        LLE Assessment (WDL): Within defined limits     Tone & Sensation:                     RLE Assessment (WDL): Within defined limits     LLE Assessment (WDL): Within defined limits      Coordination:     Vision:      Functional Mobility:  Bed Mobility:  Rolling: Minimum assistance  Supine to Sit: Minimum assistance  Sit to Supine: Minimum assistance  Scooting: Minimum assistance  Transfers:  Sit to Stand: Minimum assistance  Stand to Sit: Minimum assistance  Stand Pivot Transfers: Contact guard assistance     Bed to Chair: Contact guard assistance              Balance:   Sitting: With support  Sitting - Dynamic: Supported sitting  Standing: With support  Ambulation/Gait Training:  Distance (ft): 2 Feet (ft)  Assistive Device: Walker  Ambulation - Level of Assistance: Contact guard assistance           Right Side Weight Bearing: As tolerated  Left Side Weight Bearing: As tolerated  Base of Support: Widened     Speed/Theodora: Slow;Shuffled  Step Length: Left shortened;Right shortened                  2ft to chair   Stairs: Therapeutic Exercises:    Ankle pumps and LAQ seated X 10 each    Functional Measure:  Barthel       Physical Therapy Evaluation Charge Determination   History Examination Presentation Decision-Making   MEDIUM  Complexity : 1-2 comorbidities / personal factors will impact the outcome/ POC  MEDIUM Complexity : 3 Standardized tests and measures addressing body structure, function, activity limitation and / or participation in recreation  MEDIUM Complexity : Evolving with changing characteristics  Other outcome measures Barthel 30  MEDIUM      Based on the above components, the patient evaluation is determined to be of the following complexity level: MEDIUM    Pain Ratin/10    Activity Tolerance:   Good    After treatment patient left in no apparent distress:   Sitting in chair, Call bell within reach, Bed / chair alarm activated, and Caregiver / family present    COMMUNICATION/EDUCATION:   The patients plan of care was discussed with: Physical therapist, Occupational therapist, and Case management. Fall prevention education was provided and the patient/caregiver indicated understanding. and Patient/family agree to work toward stated goals and plan of care.     Thank you for this referral.  Melodie Cannon, PT   Time Calculation: 35 mins       2021 1149 by Dani Olivares, PT  Outcome: Progressing Towards Goal

## 2021-09-08 NOTE — PERIOP NOTES
TRANSFER - OUT REPORT:    Verbal report given to RN Anne ANDRES(name) on Guilherme Haque  being transferred to pre op 2 for routine post - op       Report consisted of patients Situation, Background, Assessment and   Recommendations(SBAR). Information from the following report(s) SBAR, OR Summary, Procedure Summary, Intake/Output, MAR and Cardiac Rhythm nsr was reviewed with the receiving nurse. Lines:   Peripheral IV 09/07/21 Left Hand (Active)   Site Assessment Clean, dry, & intact 09/07/21 1831   Phlebitis Assessment 0 09/07/21 1831   Infiltration Assessment 0 09/07/21 1831   Dressing Status Clean, dry, & intact; Occlusive 09/07/21 1831   Dressing Type Tape;Transparent 09/07/21 1831   Hub Color/Line Status Pink; Infusing 09/07/21 1831   Alcohol Cap Used Yes 09/07/21 1159        Opportunity for questions and clarification was provided.       Patient transported with:   O2 @ 2 liters  Registered Nurse

## 2021-09-08 NOTE — PROGRESS NOTES
Problem: Mobility Impaired (Adult and Pediatric)  Goal: *Therapy Goal (Edit Goal, Insert Text)  Description: FUNCTIONAL STATUS PRIOR TO ADMISSION: Indep        HOME SUPPORT PRIOR TO ADMISSION: The patient lived alone with no local support. Physical Therapy Goals  Initiated 9/8/2021  1. Patient will move from supine to sit and sit to supine  in bed with independence within 7 day(s). 2.  Patient will transfer from bed to chair and chair to bed with modified independence using the least restrictive device within 7 day(s). 3.  Patient will perform sit to stand with modified independence within 7 day(s). 4.  Patient will ambulate with modified independence for 300 feet with the least restrictive device within 7 day(s). 5.  Patient will ascend/descend 5 stairs with bilatraleral handrail(s) with supervision/set-up within 7 day(s). 9/8/2021 1356 by Maty Donovan, PT  Outcome: Progressing Towards Goal  Note:   PHYSICAL THERAPY TREATMENT  Patient: Sin Mitchell (79 y.o. male)  Date: 9/8/2021  Diagnosis: Spondylolisthesis, lumbar region [M43.16] <principal problem not specified>  Procedure(s) (LRB):  L4-5 LAMINECTOMY/FUSION WITH INSTRUMENTATION/TLIF/BONE GRAFT (N/A) 1 Day Post-Op  Precautions:    Chart, physical therapy assessment, plan of care and goals were reviewed. ASSESSMENT  Patient continues with skilled PT services and is progressing towards goals. He was able to ambulate with walker 100 ft this afternoon. He still struggles with log roll. Current Level of Function Impacting Discharge (mobility/balance): home with family support    Other factors to consider for discharge:          PLAN :  Patient continues to benefit from skilled intervention to address the above impairments. Continue treatment per established plan of care. to address goals.     Recommendation for discharge: (in order for the patient to meet his/her long term goals)  Physical therapy at least 2 days/week in the home     This discharge recommendation:  Has been made in collaboration with the attending provider and/or case management    IF patient discharges home will need the following DME: patient owns DME required for discharge       SUBJECTIVE:   Patient stated I can do better than this am.    OBJECTIVE DATA SUMMARY:   Critical Behavior:  Neurologic State: Alert  Orientation Level: Oriented to time, Oriented to situation, Oriented to place, Oriented to person  Cognition: Follows commands     Functional Mobility Training:  Bed Mobility:  Rolling: Minimum assistance  Supine to Sit: Minimum assistance  Sit to Supine: Minimum assistance  Scooting: Minimum assistance        Transfers:  Sit to Stand: Contact guard assistance  Stand to Sit: Contact guard assistance  Stand Pivot Transfers: Contact guard assistance     Bed to Chair: Contact guard assistance                    Balance:  Sitting: With support  Sitting - Dynamic: Supported sitting  Standing: With support  Ambulation/Gait Training:  Distance (ft): 100 Feet (ft)  Assistive Device: Walker  Ambulation - Level of Assistance: Contact guard assistance           Right Side Weight Bearing: As tolerated  Left Side Weight Bearing: As tolerated  Base of Support: Widened     Speed/Theodora: Slow;Shuffled  Step Length: Left shortened;Right shortened                    Stairs: Therapeutic Exercises:     Pain Rating:      Activity Tolerance:   Good    After treatment patient left in no apparent distress:   Supine in bed, Call bell within reach, and Bed / chair alarm activated    COMMUNICATION/COLLABORATION:   The patients plan of care was discussed with: Physical therapist and Registered nurse.      Rebel Walker, PT   Time Calculation: 25 mins         9/8/2021 1208 by Vibha Christianson PT  Outcome: Progressing Towards Goal  Note:   PHYSICAL THERAPY EVALUATION  Patient: Tiffanie Du (89 y.o. male)  Date: 9/8/2021  Primary Diagnosis: Spondylolisthesis, lumbar region [M43.16]  Procedure(s) (LRB):  L4-5 LAMINECTOMY/FUSION WITH INSTRUMENTATION/TLIF/BONE GRAFT (N/A) 1 Day Post-Op   Precautions:spinal, brace when OOB      ASSESSMENT  Based on the objective data described below, the patient presents with bilateral LE weakness following surgery. Brace is in place and he required instruction donning and doffing. Wife was present for eval and reported understanding of brace don/ doff as well as guarding precautions at this time. She reports that she is purchasing recliner for return to home with him. 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 They have 2 children under age of five at home, but wife states she will manage them during his recovery. Pt was educated on log roll for supine to sit but needed several verbal cues and reminders. Pt is able to sit to stand contact guard with walker. He ambulated to chair and reports minimal pain but required several rest breaks to get from supine to the chair    Current Level of Function Impacting Discharge (mobility/balance): poor balance some confusion with bracing and precations    Functional Outcome Measure: The patient scored 30 on the Barthel outcome measure which is indicative of fall risk and need for assistance at home with ADLs. Other factors to consider for discharge: 2 story home and 5 steps to enter     Patient will benefit from skilled therapy intervention to address the above noted impairments. PLAN :  Recommendations and Planned Interventions: bed mobility training, transfer training, gait training, therapeutic exercises, orthotic/prosthetic training, and therapeutic activities      Frequency/Duration: Patient will be followed by physical therapy:  5 times a week to address goals.     Recommendation for discharge: (in order for the patient to meet his/her long term goals)  Physical therapy at least 2 days/week in the home AND ensure assist and/or supervision for safety with home with wife    This discharge recommendation:  Has been made in collaboration with the attending provider and/or case management    IF patient discharges home will need the following DME: walker         SUBJECTIVE:    Pt had wife present for eval. \"Really want to go home but there is a burning sensation down my spine\"  OBJECTIVE DATA SUMMARY:   HISTORY:    Past Medical History:   Diagnosis Date    Adverse effect of anesthesia     facial itchin upon waking up; resolved with benedryl    Chronic low back pain     COVID-19 01/2021    COVID-19 vaccine series completed 2021    Hypercholesterolemia      Past Surgical History:   Procedure Laterality Date    HX KNEE ARTHROSCOPY Bilateral     HX LYMPH NODE DISSECTION Left 2000    HX ROTATOR CUFF REPAIR Right        Personal factors and/or comorbidities impacting plan of care:     Home Situation  Home Environment: Private residence  # Steps to Enter: 5  Rails to Enter: Yes  Hand Rails : Bilateral  One/Two Story Residence: Two story  # of Interior Steps: 14  Interior Rails: Right  Lift Chair Available: No  Living Alone: No  Support Systems: Spouse/Significant Other  Patient Expects to be Discharged to[de-identified] [employer]  Current DME Used/Available at Home: Brace/Splint, Walker  Tub or Shower Type: Tub/Shower combination    EXAMINATION/PRESENTATION/DECISION MAKING:   Critical Behavior:  Neurologic State: Alert  Orientation Level: Oriented to time, Oriented to situation, Oriented to place, Oriented to person  Cognition: Follows commands     Hearing:     Skin:    Edema: none noted  Range Of Motion:     RLE Assessment (WDL): Within defined limits              LLE Assessment (WDL): Within defined limits     Strength:          RLE Assessment (WDL): Within defined limits        LLE Assessment (WDL): Within defined limits     Tone & Sensation:                     RLE Assessment (WDL): Within defined limits     LLE Assessment (WDL): Within defined limits      Coordination:     Vision: Functional Mobility:  Bed Mobility:  Rolling: Minimum assistance  Supine to Sit: Minimum assistance  Sit to Supine: Minimum assistance  Scooting: Minimum assistance  Transfers:  Sit to Stand: Minimum assistance  Stand to Sit: Minimum assistance  Stand Pivot Transfers: Contact guard assistance     Bed to Chair: Contact guard assistance              Balance:   Sitting: With support  Sitting - Dynamic: Supported sitting  Standing: With support  Ambulation/Gait Training:  Distance (ft): 2 Feet (ft)  Assistive Device: Walker  Ambulation - Level of Assistance: Contact guard assistance           Right Side Weight Bearing: As tolerated  Left Side Weight Bearing: As tolerated  Base of Support: Widened     Speed/Theodora: Slow;Shuffled  Step Length: Left shortened;Right shortened                  2ft to chair   Stairs: Therapeutic Exercises: Ankle pumps and LAQ seated X 10 each    Functional Measure:  Barthel       Physical Therapy Evaluation Charge Determination   History Examination Presentation Decision-Making   MEDIUM  Complexity : 1-2 comorbidities / personal factors will impact the outcome/ POC  MEDIUM Complexity : 3 Standardized tests and measures addressing body structure, function, activity limitation and / or participation in recreation  MEDIUM Complexity : Evolving with changing characteristics  Other outcome measures Barthel 30  MEDIUM      Based on the above components, the patient evaluation is determined to be of the following complexity level: MEDIUM    Pain Ratin/10    Activity Tolerance:   Good    After treatment patient left in no apparent distress:   Sitting in chair, Call bell within reach, Bed / chair alarm activated, and Caregiver / family present    COMMUNICATION/EDUCATION:   The patients plan of care was discussed with: Physical therapist, Occupational therapist, and Case management. Fall prevention education was provided and the patient/caregiver indicated understanding. and Patient/family agree to work toward stated goals and plan of care.     Thank you for this referral.  Corine Rodas, PT   Time Calculation: 35 mins       9/8/2021 1149 by Lety Escalona PT  Outcome: Progressing Towards Goal

## 2021-09-08 NOTE — PERIOP NOTES
Bedside and Verbal shift change report given to Dinora (oncoming nurse) by Sylvia Gomes (offgoing nurse). Report included the following information SBAR, OR Summary, MAR and Recent Results. 0400 Morning labs drawn. Patient in no acute distress. Pain tolerable and well controlled with PCA. Morning assessment completed. 0500 Cartagena catheter removed     0530 Patient given drinks and snacks, water. No complaints at this time.

## 2021-09-08 NOTE — PERIOP NOTES
Patient notified his wife of his new room assignment of 5, patient transferred for further medical management.

## 2021-09-08 NOTE — ROUTINE PROCESS
TRANSFER - OUT REPORT:    Verbal report given to Rakesh Culver RN(name) on Job Tex  being transferred to [facility](unit) for routine post - op       Report consisted of patients Situation, Background, Assessment and   Recommendations(SBAR). Information from the following report(s) SBAR and MAR was reviewed with the receiving nurse. Opportunity for questions and clarification was provided.       Patient transported with:   Registered Nurse

## 2021-09-08 NOTE — PROGRESS NOTES
ORTHOPAEDIC LUMBAR FUSION PROGRESS NOTE    NAME:     Caren Presramón   :       1970   MRN:       [id number]   DATE:      2021    POD:    1 Day Post-Op  S/P:    Procedure(s):  L4-5 LAMINECTOMY/FUSION WITH INSTRUMENTATION/TLIF/BONE GRAFT    SUBJECTIVE:    C/O back pain along surgical incision  No leg pain or numbness  Denies nausea/vomiting, headache, chest pain or shortness of breath  Pain controlled    Recent Labs     21  0400   HGB 12.3      K 3.6   *   CO2 27   BUN 11   CREA 0.77   *     Patient Vitals for the past 12 hrs:   BP Temp Pulse Resp SpO2   21 0400 111/63 -- 67 18 98 %   21 0210 116/71 -- 62 12 98 %   21 0010 112/68 -- 87 16 96 %   21 2300 118/79 98.1 °F (36.7 °C) 88 12 98 %       EXAM:  Dressings clean, dry and intact   Positive strength/ROM bilat lower ext.   Neuro intact to sensation  Calves, soft & nontender  BL LEs NVID      PLAN:  D/C PCA, change to prn PO pain medications  Monitor hemovac drain output  PT/OT, OOB w/ assist  Advance diet as tolerated      Bharti Fisher, [place]  Orthopaedic Surgery  Physician Assistant to Dr. Sridevi Rodriguez

## 2021-09-09 VITALS
RESPIRATION RATE: 16 BRPM | SYSTOLIC BLOOD PRESSURE: 106 MMHG | HEIGHT: 69 IN | BODY MASS INDEX: 23.7 KG/M2 | HEART RATE: 81 BPM | OXYGEN SATURATION: 95 % | TEMPERATURE: 98.1 F | DIASTOLIC BLOOD PRESSURE: 68 MMHG | WEIGHT: 160 LBS

## 2021-09-09 LAB
ANION GAP SERPL CALC-SCNC: 3 MMOL/L (ref 5–15)
BUN SERPL-MCNC: 10 MG/DL (ref 6–20)
BUN/CREAT SERPL: 10 (ref 12–20)
CALCIUM SERPL-MCNC: 7.7 MG/DL (ref 8.5–10.1)
CHLORIDE SERPL-SCNC: 106 MMOL/L (ref 97–108)
CO2 SERPL-SCNC: 31 MMOL/L (ref 21–32)
CREAT SERPL-MCNC: 0.99 MG/DL (ref 0.7–1.3)
GLUCOSE SERPL-MCNC: 110 MG/DL (ref 65–100)
HGB BLD-MCNC: 12.5 G/DL (ref 12.1–17)
POTASSIUM SERPL-SCNC: 3.7 MMOL/L (ref 3.5–5.1)
SODIUM SERPL-SCNC: 140 MMOL/L (ref 136–145)

## 2021-09-09 PROCEDURE — 74011250637 HC RX REV CODE- 250/637: Performed by: ORTHOPAEDIC SURGERY

## 2021-09-09 PROCEDURE — 85018 HEMOGLOBIN: CPT

## 2021-09-09 PROCEDURE — 97116 GAIT TRAINING THERAPY: CPT

## 2021-09-09 PROCEDURE — 97530 THERAPEUTIC ACTIVITIES: CPT

## 2021-09-09 PROCEDURE — 94760 N-INVAS EAR/PLS OXIMETRY 1: CPT

## 2021-09-09 PROCEDURE — 99218 HC RM OBSERVATION: CPT

## 2021-09-09 PROCEDURE — 74011250637 HC RX REV CODE- 250/637: Performed by: PHYSICIAN ASSISTANT

## 2021-09-09 PROCEDURE — 80048 BASIC METABOLIC PNL TOTAL CA: CPT

## 2021-09-09 PROCEDURE — 97535 SELF CARE MNGMENT TRAINING: CPT

## 2021-09-09 PROCEDURE — 36415 COLL VENOUS BLD VENIPUNCTURE: CPT

## 2021-09-09 RX ORDER — AMOXICILLIN 250 MG
1 CAPSULE ORAL 2 TIMES DAILY
Qty: 60 TABLET | Refills: 0 | Status: SHIPPED | OUTPATIENT
Start: 2021-09-09 | End: 2022-11-02 | Stop reason: ALTCHOICE

## 2021-09-09 RX ORDER — BACLOFEN 10 MG/1
10 TABLET ORAL
Qty: 30 TABLET | Refills: 0 | Status: SHIPPED | OUTPATIENT
Start: 2021-09-09 | End: 2022-11-02 | Stop reason: ALTCHOICE

## 2021-09-09 RX ORDER — HYDROMORPHONE HYDROCHLORIDE 2 MG/1
2-4 TABLET ORAL
Qty: 50 TABLET | Refills: 0 | Status: SHIPPED | OUTPATIENT
Start: 2021-09-09 | End: 2021-09-16

## 2021-09-09 RX ORDER — NALOXONE HYDROCHLORIDE 4 MG/.1ML
SPRAY NASAL
Qty: 1 EACH | Refills: 0 | Status: SHIPPED | OUTPATIENT
Start: 2021-09-09 | End: 2022-11-02 | Stop reason: ALTCHOICE

## 2021-09-09 RX ORDER — BACLOFEN 10 MG/1
10 TABLET ORAL
Status: DISCONTINUED | OUTPATIENT
Start: 2021-09-09 | End: 2021-09-09 | Stop reason: HOSPADM

## 2021-09-09 RX ADMIN — DOCUSATE SODIUM 50MG AND SENNOSIDES 8.6MG 1 TABLET: 8.6; 5 TABLET, FILM COATED ORAL at 09:26

## 2021-09-09 RX ADMIN — HYDROMORPHONE HYDROCHLORIDE 4 MG: 2 TABLET ORAL at 09:26

## 2021-09-09 RX ADMIN — FAMOTIDINE 20 MG: 20 TABLET ORAL at 09:26

## 2021-09-09 RX ADMIN — HYDROMORPHONE HYDROCHLORIDE 4 MG: 2 TABLET ORAL at 15:20

## 2021-09-09 RX ADMIN — Medication 10 ML: at 15:00

## 2021-09-09 RX ADMIN — Medication 10 ML: at 05:50

## 2021-09-09 RX ADMIN — POLYETHYLENE GLYCOL 3350 17 G: 17 POWDER, FOR SOLUTION ORAL at 09:27

## 2021-09-09 NOTE — PROGRESS NOTES
Problem: Self Care Deficits Care Plan (Adult)  Goal: *Acute Goals and Plan of Care (Insert Text)  Description:    FUNCTIONAL STATUS PRIOR TO ADMISSION: Patient was independent and active without use of DME. Patient was independent for basic and instrumental ADLs. HOME SUPPORT: The patient lived with wife and children but did not require assist.    Occupational Therapy Goals  Initiated 9/8/2021    1. Patient will perform lower body dressing with modified independence using AE PRN within 7 days. 2.  Patient will perform toileting and toilet transfer with modified independence using most appropriate DME within 7 days. 3.  Patient will perform grooming at modified independence, standing at sink, within 7 days. 4.  Patient will don/doff back brace at modified independence within 7 days. 5.  Patient will verbalize/demonstrate 3/3 back precautions during ADL tasks without cues within 7 days. 9/9/2021 1129 by KERRI Menard  OCCUPATIONAL THERAPY TREATMENT  Patient: Elverna Ahumada (25 y.o. male)  Date: 9/9/2021  Diagnosis: Spondylolisthesis, lumbar region [M43.16] <principal problem not specified>  Procedure(s) (LRB):  L4-5 LAMINECTOMY/FUSION WITH INSTRUMENTATION/TLIF/BONE GRAFT (N/A) 2 Days Post-Op  Precautions:    Chart, occupational therapy assessment, plan of care, and goals were reviewed. ASSESSMENT  Patient continues with skilled OT services and is progressing towards goals. Pt seated in chair verbalizing he wants to brush his teeth. Pt ambulated to restroom, compensatory techniques taught to adhere to spinal precautions. Pt stated he would be obtaining hip kt for home use. Pt is clear from an OT standpoint for home when medically stable. Current Level of Function Impacting Discharge (ADLs): mod I grooming, LB dress with use of AE    Other factors to consider for discharge:          PLAN :  Patient continues to benefit from skilled intervention to address the above impairments.   Continue treatment per established plan of care to address goals. Recommend with staff: out of bed for meals, ADl's, there ex, there act    Recommend next OT session: pt clear from an OT standpoint    Recommendation for discharge: (in order for the patient to meet his/her long term goals)  No skilled occupational therapy/ follow up rehabilitation needs identified at this time. This discharge recommendation:  Has not yet been discussed the attending provider and/or case management    IF patient discharges home will need the following DME:        SUBJECTIVE:   Patient stated . I have been going in the restroom    OBJECTIVE DATA SUMMARY:   Cognitive/Behavioral Status:  Neurologic State: Alert  Orientation Level: Oriented X4  Cognition: Follows commands             Functional Mobility and Transfers for ADLs:  Bed Mobility:   No tested as pt already out of bed    Transfers:  Sit to Stand: Supervision          Balance:  Standing: With support    ADL Intervention:       Grooming  Grooming Assistance: Modified independent  Position Performed: Standing  Brushing Teeth: Modified independent         Activity Tolerance:   Good    After treatment patient left in no apparent distress:   Sitting in chair    COMMUNICATION/COLLABORATION:   The patients plan of care was discussed with: Occupational therapist and Registered nurse.      MATI Mcneal/L  Time Calculation: 15 mins

## 2021-09-09 NOTE — PROGRESS NOTES
Problem: Mobility Impaired (Adult and Pediatric)  Goal: *Therapy Goal (Edit Goal, Insert Text)  Description: FUNCTIONAL STATUS PRIOR TO ADMISSION: Indep        HOME SUPPORT PRIOR TO ADMISSION: The patient lived alone with no local support. Physical Therapy Goals  Initiated 9/8/2021  1. Patient will move from supine to sit and sit to supine  in bed with independence within 7 day(s). 2.  Patient will transfer from bed to chair and chair to bed with modified independence using the least restrictive device within 7 day(s). 3.  Patient will perform sit to stand with modified independence within 7 day(s). 4.  Patient will ambulate with modified independence for 300 feet with the least restrictive device within 7 day(s). 5.  Patient will ascend/descend 5 stairs with bilatraleral handrail(s) with supervision/set-up within 7 day(s). 9/9/2021 1453 by Genie Obrien PT  Outcome: Progressing Towards Goal  Note:   PHYSICAL THERAPY TREATMENT  Patient: Marta John (27 y.o. male)  Date: 9/9/2021  Diagnosis: Spondylolisthesis, lumbar region [M43.16] <principal problem not specified>  Procedure(s) (LRB):  L4-5 LAMINECTOMY/FUSION WITH INSTRUMENTATION/TLIF/BONE GRAFT (N/A) 2 Days Post-Op  Precautions:    Chart, physical therapy assessment, plan of care and goals were reviewed. ASSESSMENT  Patient continues with skilled PT services and is progressing towards goals. Pt was able to demo gait in gutiérrez Xs 500ft with supervision to mod indep with walker. He attempted 100ft with straight cane contact guard . Transfers with cane contact guard but slight loss of balance and would benefit from walker order. Order was placed. He also demonstrated a full flight of stairs supervision. Educated him that a family member must be present. He has been cleared by therapy from a mobility standpoint with supervision and walker.     Current Level of Function Impacting Discharge (mobility/balance): waiting on dc of drain    Other factors to consider for discharge: na         PLAN :  Patient continues to benefit from skilled intervention to address the above impairments. Continue treatment per established plan of care. to address goals. Recommendation for discharge: (in order for the patient to meet his/her long term goals)  Physical therapy at least 2 days/week in the home     This discharge recommendation:  Has been made in collaboration with the attending provider and/or case management    IF patient discharges home will need the following DME: rolling walker       SUBJECTIVE:   Patient stated I am feeling much better and really very little pain in and out of bed now.     OBJECTIVE DATA SUMMARY:   Critical Behavior:  Neurologic State: Alert  Orientation Level: Oriented X4  Cognition: Follows commands  Safety/Judgement: Awareness of environment, Fall prevention, Good awareness of safety precautions, Home safety, Insight into deficits  Functional Mobility Training:  Bed Mobility:  Rolling: Modified independent  Supine to Sit: Supervision;Modified independent  Sit to Supine: Supervision;Modified independent  Scooting: Modified independent        Transfers:  Sit to Stand: Supervision;Modified independent  Stand to Sit: Modified independent;Supervision  Stand Pivot Transfers: Supervision;Modified independent                          Balance:  Sitting: Intact  Sitting - Static: Good (unsupported)  Sitting - Dynamic: Good (unsupported)  Standing: With support  Ambulation/Gait Training:  Distance (ft): 500 Feet (ft)  Assistive Device: Walker;Cane, straight  Ambulation - Level of Assistance: Supervision;Modified independent        Gait Abnormalities: Decreased step clearance        Base of Support: Widened     Speed/Theodora: Shuffled  Step Length:  Other (comment)                  WFL with FWW  Stairs:  Number of Stairs Trained: 12  Stairs - Level of Assistance: Stand-by assistance   Rail Use: Both    Therapeutic Exercises:     Pain Ratin/10    Activity Tolerance:   Good    After treatment patient left in no apparent distress:   Supine in bed, Call bell within reach, and Bed / chair alarm activated    COMMUNICATION/COLLABORATION:   The patients plan of care was discussed with: Physical therapist, Registered nurse, and Case management. Tavon Guerrero, PT   Time Calculation: 28 mins         2021 1116 by Raul Varma PT  Outcome: Progressing Towards Goal  Note:   PHYSICAL THERAPY TREATMENT  Patient: Magalys King (89 y.o. male)  Date: 2021  Diagnosis: Spondylolisthesis, lumbar region [M43.16] <principal problem not specified>  Procedure(s) (LRB):  L4-5 LAMINECTOMY/FUSION WITH INSTRUMENTATION/TLIF/BONE GRAFT (N/A) 2 Days Post-Op  Precautions:    Chart, physical therapy assessment, plan of care and goals were reviewed. ASSESSMENT  Patient continues with skilled PT services and is progressing towards goals. Pt demo 7 stairs contact guard assist with but requires at 14 to get to bed and bath. Patient was step to on stairs and utlized both rail but has single rail up the second half of his stairs in home. He is off pain pump and Ivs at time of visit but continues with drain. He reports improved pain with bed mobility although not willing to demo. He was able to increase ambulation distance to 300ft with walker but required brace adjustment as he was unable to don properly. Father was present in room for treatment. Based on progress and pain from prior day, we plan to see him again this afternoon to attempt full flight of stairs    Current Level of Function Impacting Discharge (mobility/balance): 2 story home with varying railings    Other factors to consider for discharge: pt has good family support         PLAN :  Patient continues to benefit from skilled intervention to address the above impairments. Continue treatment per established plan of care. to address goals.     Recommendation for discharge: (in order for the patient to meet his/her long term goals)  Physical therapy at least 2 days/week in the home     This discharge recommendation:  Has been made in collaboration with the attending provider and/or case management    IF patient discharges home will need the following DME: patient owns DME required for discharge       SUBJECTIVE:   Patient stated i'm feeling much more stable here at home.     OBJECTIVE DATA SUMMARY:   Critical Behavior:  Neurologic State: Alert  Orientation Level: Oriented X4  Cognition: Follows commands  Safety/Judgement: Awareness of environment, Fall prevention, Good awareness of safety precautions, Home safety, Insight into deficits  Functional Mobility Training:  Bed Mobility:                    Transfers:  Sit to Stand: Supervision  Stand to Sit: Supervision  Stand Pivot Transfers: Modified independent;Supervision                          Balance:  Standing: With support  Ambulation/Gait Training:  Distance (ft): 300 Feet (ft)  Assistive Device: Walker  Ambulation - Level of Assistance: Supervision;Contact guard assistance        Gait Abnormalities: Decreased step clearance        Base of Support: Widened     Speed/Theodora: Shuffled                       Stairs:  Number of Stairs Trained: 7  Stairs - Level of Assistance: Contact guard assistance;Minimum assistance   Rail Use: Both    Therapeutic Exercises:     Pain Ratin/10    Activity Tolerance:   Good and Fair    After treatment patient left in no apparent distress:   Sitting in chair, Bed / chair alarm activated, and Caregiver / family present    COMMUNICATION/COLLABORATION:   The patients plan of care was discussed with: Physical therapist and Case management.      Stu Angela PT   Time Calculation: 30 mins

## 2021-09-09 NOTE — PROGRESS NOTES
1958-  bedside shift change report given to Gracia Barrera RN (oncoming nurse by Becky Barnard LPN (offgoing nurse) to include SBAR, Kardex, and MAR. Pt. In bed watching TV. Bed in lowest position and wheels locked; call bell and patient belongings within reach. Father at bedside.

## 2021-09-09 NOTE — PROGRESS NOTES
Problem: Mobility Impaired (Adult and Pediatric)  Goal: *Therapy Goal (Edit Goal, Insert Text)  Description: FUNCTIONAL STATUS PRIOR TO ADMISSION: Indep        HOME SUPPORT PRIOR TO ADMISSION: The patient lived alone with no local support. Physical Therapy Goals  Initiated 9/8/2021  1. Patient will move from supine to sit and sit to supine  in bed with independence within 7 day(s). 2.  Patient will transfer from bed to chair and chair to bed with modified independence using the least restrictive device within 7 day(s). 3.  Patient will perform sit to stand with modified independence within 7 day(s). 4.  Patient will ambulate with modified independence for 300 feet with the least restrictive device within 7 day(s). 5.  Patient will ascend/descend 5 stairs with bilatraleral handrail(s) with supervision/set-up within 7 day(s). Outcome: Progressing Towards Goal  Note:   PHYSICAL THERAPY TREATMENT  Patient: Ksenia Arita (8 y.o. male)  Date: 9/9/2021  Diagnosis: Spondylolisthesis, lumbar region [M43.16] <principal problem not specified>  Procedure(s) (LRB):  L4-5 LAMINECTOMY/FUSION WITH INSTRUMENTATION/TLIF/BONE GRAFT (N/A) 2 Days Post-Op  Precautions:    Chart, physical therapy assessment, plan of care and goals were reviewed. ASSESSMENT  Patient continues with skilled PT services and is progressing towards goals. Pt demo 7 stairs contact guard assist with but requires at 14 to get to bed and bath. Patient was step to on stairs and utlized both rail but has single rail up the second half of his stairs in home. He is off pain pump and Ivs at time of visit but continues with drain. He reports improved pain with bed mobility although not willing to demo. He was able to increase ambulation distance to 300ft with walker but required brace adjustment as he was unable to don properly. Father was present in room for treatment.  Based on progress and pain from prior day, we plan to see him again this afternoon to attempt full flight of stairs    Current Level of Function Impacting Discharge (mobility/balance): 2 story home with varying railings    Other factors to consider for discharge: pt has good family support         PLAN :  Patient continues to benefit from skilled intervention to address the above impairments. Continue treatment per established plan of care. to address goals. Recommendation for discharge: (in order for the patient to meet his/her long term goals)  Physical therapy at least 2 days/week in the home     This discharge recommendation:  Has been made in collaboration with the attending provider and/or case management    IF patient discharges home will need the following DME: patient owns DME required for discharge       SUBJECTIVE:   Patient stated i'm feeling much more stable here at home.     OBJECTIVE DATA SUMMARY:   Critical Behavior:  Neurologic State: Alert  Orientation Level: Oriented X4  Cognition: Follows commands  Safety/Judgement: Awareness of environment, Fall prevention, Good awareness of safety precautions, Home safety, Insight into deficits  Functional Mobility Training:  Bed Mobility:                    Transfers:  Sit to Stand: Supervision  Stand to Sit: Supervision  Stand Pivot Transfers: Modified independent;Supervision                          Balance:  Standing: With support  Ambulation/Gait Training:  Distance (ft): 300 Feet (ft)  Assistive Device: Walker  Ambulation - Level of Assistance: Supervision;Contact guard assistance        Gait Abnormalities: Decreased step clearance        Base of Support: Widened     Speed/Theodora: Shuffled                       Stairs:  Number of Stairs Trained: 7  Stairs - Level of Assistance: Contact guard assistance;Minimum assistance   Rail Use: Both    Therapeutic Exercises:     Pain Ratin/10    Activity Tolerance:   Good and Fair    After treatment patient left in no apparent distress:   Sitting in chair, Bed / chair alarm activated, and Caregiver / family present    COMMUNICATION/COLLABORATION:   The patients plan of care was discussed with: Physical therapist and Case management.      Marcus Goss PT   Time Calculation: 30 mins

## 2021-09-09 NOTE — PROGRESS NOTES
Patient has cleared physical therapy and would like to be discharged home. Hemovac drain output 60ml between 10pm last night and 10am this morning. Drain output is now 29ml over the course of ~5 hours today. Discussed with Dr. Madelaine Horne. Per Dr. Diane Suarez to remove hemovac drain and discharge patient home. Orders placed accordingly.     Kezia Devine NP

## 2021-09-09 NOTE — PROGRESS NOTES
ORTHOPAEDIC LUMBAR FUSION PROGRESS NOTE    NAME:     Shayna Alaniz   :       1970   MRN:       [id number]   DATE:      2021    POD:    2 Days Post-Op  S/P:    Procedure(s):  L4-5 LAMINECTOMY/FUSION WITH INSTRUMENTATION/TLIF/BONE GRAFT    SUBJECTIVE:    C/O back pain along surgical incision, having some spasms  No leg pain or numbness  Denies nausea/vomiting, headache, chest pain or shortness of breath      Recent Labs     21  0144   HGB 12.5      K 3.7      CO2 31   BUN 10   CREA 0.99   *     Patient Vitals for the past 12 hrs:   BP Temp Pulse Resp SpO2   21 0757 121/60 98.7 °F (37.1 °C) 87 16 95 %   21 0341 (!) 100/59 98.8 °F (37.1 °C) 90 14 95 %   21 0030 (!) 98/49 98.8 °F (37.1 °C) 95 16 93 %       EXAM:  Dressings clean, dry and intact   Positive strength/ROM bilat lower ext.   Neuro intact to sensation  Calves, soft & nontender  BL LEs NVID      PLAN:  Continue prn PO pain medications  Monitor hemovac drain output  PT/OT, OOB w/ assist  Stop flexeril, change to Baclofen  Tolerating PO      Berny Loo, [address]  Orthopaedic Surgery  Physician Assistant to Dr. Mariia Cheng

## 2021-09-09 NOTE — DISCHARGE INSTRUCTIONS
Lumbar Spinal Surgery Discharge Instructions   Dr. Estella Booth  [phone]    Activity:    Lovely Lyons You are going home a well person, be as active as possible. Your only exercise should be walking. Start with short frequent walks and increase your walking distance each day. Start with walking twice a day for 5 minutes and increase your distance each day 2-3 minutes until you reach 25 minutes twice a day. Limit the amount of time you sit to 20-30 minute intervals. Sitting for prolonged periods of time will be uncomfortable for you following your surgery.  No bending, lifting (of 5lbs or more), twisting, or straining.  Do not drive until your doctor states you may do so. However, you may ride in a car for short distances.  If you are required to wear a brace, you should wear it at all times when you are out of bed. You may remove it when sleeping unless your physician advises you against it.  When you are in the bed, you may lay on your back or on either side. Do not lie on your stomach.  You may resume sexual relations 3-4 weeks after surgery depending on how you are feeling.  Continue to use your incentive spirometer for deep breathing exercises. Driving:   You may not drive or return to work until instructed by your physician. However, you may ride in the car for short periods of time. Brace:   If you have a back brace, you should wear your brace at all times when you are out of bed. Do not wear the brace while in bed or showering.  Remember to always wear a cotton t-shirt underneath your brace.  Do not bend or twist when your brace is off. Diet:   You may resume your regular home diet as tolerated. If your throat is sore, you should eat soft foods for the first couple of days.  Be sure to drink plenty of fluids; it is important to keep yourself hydrated.  Avoid alcoholic beverages and ABSOLUTELY NO tobacco products.  Tobacco products will interfere with your healing. If you continue to use tobacco, you may end up needing another surgery in the future. Dressing: You have an OptiFoam dressing. This is waterproof and you may shower with it on. This should be removed 7 days after surgery. You have on a Prineo dressing underneath the OptiFoam. This is a waterproof bacteriostatic dressing that requires no post-operative care. Please do not peel the dressing off, or apply any oil based products, as they may expedite the deterioration of the mesh. The dressing will slowly chip off on its own.  Do not rub or apply lotion or ointments to incision site. Shower:   You may remove your brace during showers.  NO not use tub baths, swimming pools or Jacuzzis. Medications:   Check with your physician before taking any anti-inflammatory medications. (Advil, Aleve, Ibuprofen, Aspirin)   Take your pain medication as directed   Do NOT take additional Tylenol if your prescribed pain medication has acetaminophen in it (Endocet/Percocet, Lortab, Norco)   It is important to have regular bowel movements. Pain medications can be constipating. Stool softeners, warm prune juice, increasing your water intake, and increasing fiber in your diet can help in preventing constipation.  Do NOT take laxatives if at all possible except in severe situations. It can result in a vicious cycle of constipation and diarrhea. Follow-Up    Please call ASAP to schedule your 1st post-operative appointment. This should be approximately 3 weeks from your surgery date. Notify your physician in you develop any of the following conditions:   Fever above 101 degrees for 24 hours.  Nausea or vomiting.  Severe headache.  Inability to urinate   Loss of bowel or bladder function (sudden onset of incontinence)   Changes in sensation in your extremities (numbness, tingling, loss of color).  Severe pain or pain not relieved by medications.    Redness, swelling, or drainage from your incision.  Persistent pain in the chest.    Pain in the calf of either leg.  Increased weakness (if this is greater than before your surgery). If you have any questions about your dressing contact your Orthopaedic Surgeons office. OFFICE OF DR. Ana Sotelo   [phone]  [place] [id number] [address] [place] [address], [facility] [address] [facility]     ** IMPORTANT: UNDER YOUR OPTIFOAM DRESSING, YOU HAVE AN ADHESIVE MESH DIRECTLY OVER YOUR INCISION. THIS MESH WAS STERILELY GLUED TO YOUR SKIN DURING SURGERY. DO NOT REMOVE THIS. NO ONE ELSE SHOULD REMOVE IT EITHER. IT WILL COME OFF ON ITS OWN OVER TIME    YOUR OPTIFOAM DRESSING SHOULD REMAIN IN PLACE FOR 1 WEEK. IT IS A WATERPROOF DRESSING AS LONG AS IT'S PLACEMENT IS INTACT. YOU CAN SHOWER AS INDICATED BELOW IN YOUR DISCHARGE INSTRUCTIONS    * WEAR YOUR BRACE AS ADVISED    * NO DRIVING UNTIL YOU ARE CLEARED TO DO SO BY YOUR SURGEON    * LIMIT LIFTING, BENDING AND TWISTING.  NO LIFTING MORE THAN 5 LBS    * MAKE SURE YOU ARE GETTING GOOD NUTRITION (Lean Protein, Vitamin D AND Calcium)    * DO NOT TAKE ANY NSAIDs FOR THE FIRST 3 MONTHS AFTER SURGERY (such as Advil/Ibuprofen/Motrin, Aleve/Naproxen/Naprosyn, Diclofenac, Celebrex, Meloxicam, Indomethacin, Goody's powder, BC powder etc.)    * NO NICOTINE PRODUCTS    * FULLY READ YOUR DISCHARGE INSTRUCTIONS

## 2021-09-27 NOTE — DISCHARGE SUMMARY
DISCHARGE SUMMARY     Patient: Bernarda Calixto                             Medical Record Number: [id number]                : 1970  Age: 46 y.o. Admit Date: 2021  Discharge Date: 2021  Admission Diagnosis: Spondylolisthesis, lumbar region [M43.16]  Discharge Diagnosis: RADICULITIS/SPONDYLOLISTHESIS/STENOSIS  Procedures: Procedure(s):  L4-5 LAMINECTOMY/FUSION WITH INSTRUMENTATION/TLIF/BONE GRAFT  Surgeon: Miguel Skaggs MD  Co-surgeon:   Assistants:   Anesthesia: General  Complications: None     History of Present Illness:  Bernarda Calixto is a 46 y.o. male with a history of intractable low back and radiculopathy. Despite conservative management and after clinical and radiographic evaluation, it was determined that he suffered from lumbar stenosis  and lumbar spondylolisthesis and would benefit from Procedure(s):  L4-5 LAMINECTOMY/FUSION WITH INSTRUMENTATION/TLIF/BONE GRAFT, which he consented to undergo after a discussion of the risks, benefits, alternatives, rehab concerns, and potential complications of surgery. Hospital Course:  Bernarda Calixto tolerated the procedure well. He was transferred  to the recovery room in stable condition. After a brief stay, the patient was then transferred to the Orthopedic floor. On postoperative day #1, the dressing was clean and dry and he was neurovascularly intact. The patient was afebrile and vital signs were stable. Calves were soft and non-tender bilaterally. Brenarda Calixto made excellent progress with physical therapy and was discharged to Home with home health in stable condition on postoperative day 2. He was provided with routine postoperative instructions and advised to follow up in my office in 3 weeks following discharge from the hospital.  He was given prescriptions for medication to control post-operative symptoms.     Discharge Medications:  Discharge Medication List as of 2021  4:11 PM      START taking these medications    Details HYDROmorphone (DILAUDID) 2 mg tablet Take 1-2 Tablets by mouth every four (4) hours as needed for Pain for up to 7 days. Max Daily Amount: 24 mg., Normal, Disp-50 Tablet, R-0      senna-docusate (PERICOLACE) 8.6-50 mg per tablet Take 1 Tablet by mouth two (2) times a day., Normal, Disp-60 Tablet, R-0      baclofen (LIORESAL) 10 mg tablet Take 1 Tablet by mouth three (3) times daily as needed for Muscle Spasm(s). , Normal, Disp-30 Tablet, R-0      naloxone (NARCAN) 4 mg/actuation nasal spray Use 1 spray intranasally, then discard. Repeat with new spray every 2 min as needed for opioid overdose symptoms, alternating nostrils. , Normal, Disp-1 Each, R-0         CONTINUE these medications which have NOT CHANGED    Details   gabapentin (NEURONTIN) 300 mg capsule Take 1 Tablet by mouth nightly., Historical Med      acetaminophen (Tylenol Extra Strength) 500 mg tablet Take 1,000 mg by mouth every six (6) hours as needed for Pain., Historical Med      MULTIVITAMIN PO Take 1 Tablet by mouth daily. 50+, Historical Med      prasterone, DHEA, (DHEA PO) Take 1 Tablet by mouth daily. , [facility], [address]  9/9/2021  Orthopaedic Surgery  Physician Assistant to Dr. Shayy Bailon

## 2022-03-20 PROBLEM — M43.16 SPONDYLOLISTHESIS, LUMBAR REGION: Status: ACTIVE | Noted: 2021-09-07

## 2022-11-02 ENCOUNTER — OFFICE VISIT (OUTPATIENT)
Dept: PRIMARY CARE CLINIC | Age: 52
End: 2022-11-02
Payer: COMMERCIAL

## 2022-11-02 VITALS
SYSTOLIC BLOOD PRESSURE: 128 MMHG | WEIGHT: 153.8 LBS | HEART RATE: 87 BPM | BODY MASS INDEX: 22.78 KG/M2 | RESPIRATION RATE: 16 BRPM | HEIGHT: 69 IN | DIASTOLIC BLOOD PRESSURE: 74 MMHG | OXYGEN SATURATION: 98 %

## 2022-11-02 DIAGNOSIS — Z98.1 HISTORY OF LUMBAR FUSION: ICD-10-CM

## 2022-11-02 DIAGNOSIS — M48.00 SPINAL STENOSIS, UNSPECIFIED SPINAL REGION: ICD-10-CM

## 2022-11-02 DIAGNOSIS — Z76.89 ENCOUNTER TO ESTABLISH CARE: Primary | ICD-10-CM

## 2022-11-02 DIAGNOSIS — Z13.220 LIPID SCREENING: ICD-10-CM

## 2022-11-02 DIAGNOSIS — Z78.9 ALCOHOL USE: ICD-10-CM

## 2022-11-02 DIAGNOSIS — Z98.890 HISTORY OF ARTHROSCOPIC KNEE SURGERY: ICD-10-CM

## 2022-11-02 DIAGNOSIS — Z98.890 HISTORY OF SHOULDER SURGERY: ICD-10-CM

## 2022-11-02 PROCEDURE — 99204 OFFICE O/P NEW MOD 45 MIN: CPT | Performed by: FAMILY MEDICINE

## 2022-11-02 NOTE — PROGRESS NOTES
Chief Complaint   Patient presents with    Establish Care       Visit Vitals  /74 (BP 1 Location: Right upper arm, BP Patient Position: Sitting, BP Cuff Size: Adult)   Pulse 87   Resp 16   Ht 5' 9\" (1.753 m)   Wt 153 lb 12.8 oz (69.8 kg)   SpO2 98%   BMI 22.71 kg/m²         1. \"Have you been to the ER, urgent care clinic since your last visit? Hospitalized since your last visit? \" No    2. \"Have you seen or consulted any other health care providers outside of the [address] [place] [address] since your last visit? \" No     3. For patients aged 39-70: Has the patient had a colonoscopy / FIT/ Cologuard? No      If the patient is female:    4. For patients aged 41-77: Has the patient had a mammogram within the past 2 years? NA - based on age or sex      11. For patients aged 21-65: Has the patient had a pap smear?  NA - based on age or sex

## 2022-11-02 NOTE — PROGRESS NOTES
Lia Rashid (: 1970) is a 46 y.o. male, new patient, here for evaluation of the following chief complaint(s):  Establish Care       ASSESSMENT/PLAN:  Below is the assessment and plan developed based on review of pertinent history, physical exam, labs, studies, and medications. 1. Encounter to establish care  -     METABOLIC PANEL, COMPREHENSIVE  -     CBC W/O DIFF  CAGE: 0, patient receptive to medical advice on reducing alcohol usage. Patient states he will try to cut down on drinking. Minor R radial defect possibly acquired after multiple injuries, AROM and PROM normal, will continue to monitor. Labs today. Return to office 6 months or sooner if needed. 2. Alcohol use  Chronic  3. Spinal stenosis, unspecified spinal region  Chronic  4. History of shoulder surgery  Noted  Comments:  L Rotator cuff repair  5. History of arthroscopic knee surgery  Noted  Comments:  BL knees  6. History of lumbar fusion  Noted  7. Lipid screening  -     LIPID PANEL      Return in about 6 months (around 2023) for chronic care follow up. SUBJECTIVE/OBJECTIVE:  HPI    71-year-old male past medical history of alcohol use, left rotator cuff repair, bilateral arthroscopic knee surgeries,, spinal stenosis status post lumbar fusion presents to the office to establish care. Patient is here with wife and son. 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 Patient has had orthopedic surgeries for knees, left shoulder and back. Patient states his right knee is bone-on-bone. Right knee is more painful than the left knee. He also noticed a bony prominence on his right elbow which patient states is nontender. Patient states that there is history of trauma and there is a possibility his right elbow may have been injured from a fall. Patient states he does not smoke any longer and quit in the late 1980s. Patient states he drinks alcohol more than 4 days a week.   Any single day, patient can drink more than 6 beers. Patient states reason he drinks is \" I love the taste of beer. \"  Patient does not consider drinking a problem. He has good relations with his family. Patient states he does not feel inebriated after drinking. Wife is concerned about his drinking and has spoken to him regarding cessation. Allergies   Allergen Reactions    Oxycodone Other (comments)     Hallucinations     No current outpatient medications on file. No current facility-administered medications for this visit. Past Medical History:   Diagnosis Date    Adverse effect of anesthesia     facial itchin upon waking up; resolved with benedryl    Chronic low back pain     COVID-19 2021    COVID-19 vaccine series completed     Hypercholesterolemia      Past Surgical History:   Procedure Laterality Date    HX KNEE ARTHROSCOPY Bilateral     HX LYMPH NODE DISSECTION Left 2000    HX ROTATOR CUFF REPAIR Right      Family History   Problem Relation Age of Onset    Allergic Rhinitis Father     Alcohol abuse Paternal Grandfather     Hypertension Paternal Grandfather     Heart Disease Paternal Grandfather     Coronary Art Dis Paternal Grandfather      Social History     Tobacco Use   Smoking Status Former    Types: Cigarettes    Quit date: 10/24/1988    Years since quittin.0   Smokeless Tobacco Never         Review of Systems   All other systems reviewed and are negative. /74 (BP 1 Location: Right upper arm, BP Patient Position: Sitting, BP Cuff Size: Adult)   Pulse 87   Resp 16   Ht 5' 9\" (1.753 m)   Wt 153 lb 12.8 oz (69.8 kg)   SpO2 98%   BMI 22.71 kg/m²    Physical Exam  Vitals reviewed. Constitutional:       Appearance: Normal appearance. HENT:      Head: Normocephalic and atraumatic. Eyes:      Conjunctiva/sclera: Conjunctivae normal.   Cardiovascular:      Rate and Rhythm: Normal rate and regular rhythm. Pulses: Normal pulses. Heart sounds: Normal heart sounds.    Pulmonary: Effort: Pulmonary effort is normal.      Breath sounds: Normal breath sounds. Abdominal:      General: Bowel sounds are normal.      Palpations: Abdomen is soft. Musculoskeletal:      Right elbow: Deformity present. No swelling. Normal range of motion. No tenderness. Left elbow: Normal.      Cervical back: Neck supple. Comments: Bony prominence palpated at the right lateral elbow, nontender, does not interfere with ROM. Skin:     General: Skin is warm. Capillary Refill: Capillary refill takes less than 2 seconds. Neurological:      General: No focal deficit present. Mental Status: He is alert. Sensory: Sensation is intact. Motor: Motor function is intact. Deep Tendon Reflexes: Reflexes are normal and symmetric. Psychiatric:         Mood and Affect: Mood normal.           On this date 11/02/2022 I have spent 45 minutes reviewing previous notes, test results and face to face with the patient discussing the diagnosis and importance of compliance with the treatment plan as well as documenting on the day of the visit. An electronic signature was used to authenticate this note.   -- Chico Landers MD   [facility] [id number]

## 2022-11-17 ENCOUNTER — TELEPHONE (OUTPATIENT)
Dept: PRIMARY CARE CLINIC | Age: 52
End: 2022-11-17

## 2022-11-17 ENCOUNTER — OFFICE VISIT (OUTPATIENT)
Dept: PRIMARY CARE CLINIC | Age: 52
End: 2022-11-17
Payer: COMMERCIAL

## 2022-11-17 DIAGNOSIS — J06.9 UPPER RESPIRATORY TRACT INFECTION, UNSPECIFIED TYPE: Primary | ICD-10-CM

## 2022-11-17 DIAGNOSIS — Z20.818 STREPTOCOCCUS EXPOSURE: ICD-10-CM

## 2022-11-17 PROCEDURE — 99213 OFFICE O/P EST LOW 20 MIN: CPT | Performed by: FAMILY MEDICINE

## 2022-11-17 RX ORDER — BENZONATATE 100 MG/1
CAPSULE ORAL
Qty: 40 CAPSULE | Refills: 0 | Status: SHIPPED | OUTPATIENT
Start: 2022-11-17

## 2022-11-17 RX ORDER — AMOXICILLIN 500 MG/1
500 CAPSULE ORAL 2 TIMES DAILY
Qty: 20 CAPSULE | Refills: 0 | Status: SHIPPED | OUTPATIENT
Start: 2022-11-17

## 2022-11-17 NOTE — TELEPHONE ENCOUNTER
Pt wife was at office on 11/16 and tested positive for strep, believes he has strep as well. Asking if they can be seen for an outside visit to get tested.

## 2022-11-17 NOTE — TELEPHONE ENCOUNTER
Provider: Dr. Kaveh Alvarez?: [x]Yes []No []Not Needed  Date: November 17th  Time: 5:00 pm   Visit Type: Same Day  Notes: Possible strep throat *coming in between 1:30 and 2:00 pm*

## 2022-11-17 NOTE — TELEPHONE ENCOUNTER
----- Message from Michael Dominick sent at 11/17/2022  8:23 AM EST -----  Subject: Appointment Request    Reason for Call: Established Patient Appointment needed: Semi-Routine   Cough, Cold Symptoms    QUESTIONS    Reason for appointment request? No appointments available during search     Additional Information for Provider? patient needs a appointment for a   sore throat.  ---------------------------------------------------------------------------  --------------  Bianka Wiggins [facility]  [phone]; OK to leave message on voicemail  ---------------------------------------------------------------------------  --------------  SCRIPT ANSWERS  COVID Screen: Red

## 2022-11-17 NOTE — PROGRESS NOTES
HPI     No chief complaint on file. HPI:  Genaro Fuentes is a 46 y.o. male who has concern for strep throat. His wife was just tested positive. His symptoms are moderate and associated with dry cough, malaise, mild congestion. He has had COVID a couple times in the past and symptoms feel similar. He has not tried anything for the symptoms. Allergies   Allergen Reactions    Oxycodone Other (comments)     Hallucinations       Current Outpatient Medications   Medication Sig    amoxicillin (AMOXIL) 500 mg capsule Take 1 Capsule by mouth two (2) times a day. benzonatate (TESSALON) 100 mg capsule Take 1-2 capsules po tid prn cough     No current facility-administered medications for this visit. Review of Systems   Constitutional:  Positive for malaise/fatigue. Negative for chills and fever. HENT:  Positive for congestion and sore throat. Respiratory:  Positive for cough. Negative for shortness of breath. Reviewed PmHx, FmHx, SocHx as well as meds and allergies, updated and dated in the chart. Objective     There were no vitals taken for this visit. Physical Exam  Vitals and nursing note reviewed. Constitutional:       Appearance: He is not toxic-appearing. HENT:      Head: Normocephalic and atraumatic. Mouth/Throat:      Pharynx: Posterior oropharyngeal erythema present. No oropharyngeal exudate. Eyes:      General: No scleral icterus. Conjunctiva/sclera: Conjunctivae normal.   Cardiovascular:      Rate and Rhythm: Normal rate. Pulmonary:      Effort: Pulmonary effort is normal. No respiratory distress. Breath sounds: No wheezing, rhonchi or rales. Neurological:      Mental Status: He is alert. Assessment and Plan   Patient with an exposure to Streptococcus pharyngitis. We will treat empirically with amoxicillin; however, patient's symptoms are also concerning for COVID-19. COVID-19 testing completed, PCR sent to lab.   He is to isolate in the meantime. Supportive care measures discussed. Warning signs reviewed. Prescriptions as outlined below. We will contact him with results and he will follow-up for nonimprovement or worsening symptoms. Diagnoses and all orders for this visit:    1. Upper respiratory tract infection, unspecified type  -     NOVEL CORONAVIRUS (COVID-19)  -     benzonatate (TESSALON) 100 mg capsule; Take 1-2 capsules po tid prn cough    2. Streptococcus exposure  -     amoxicillin (AMOXIL) 500 mg capsule; Take 1 Capsule by mouth two (2) times a day. As applicable:  Medication Side Effects and Warnings were discussed with patient. Patient Labs were reviewed and or requested. Patient Past Records were reviewed and or requested. I have discussed the diagnosis with the patient and the intended plan as seen in the above orders. The patient has received an after-visit summary and questions were answered concerning future plans. I have discussed medication side effects and warnings with the patient as well.       Sara Cedeño MD  [address] [place] [address]

## 2022-11-17 NOTE — LETTER
NOTIFICATION RETURN TO WORK / SCHOOL    11/17/2022 2:15 PM    Mr. Lia Rashid  [facility] [address] [id number]      To Whom It May Concern:    Lia Rashid is currently under the care of Johnathan Aranda. He will return to work/school on: November 21, 2022    If there are questions or concerns please have the patient contact our office.         Sincerely,      Ne Garcia MD

## 2022-11-18 LAB
SARS-COV-2, NAA 2 DAY TAT: NORMAL
SARS-COV-2, NAA: NOT DETECTED

## 2022-11-18 NOTE — PROGRESS NOTES
[employer] message sent:    Niurka Jacob,    Your COVID test is negative. This is good news. Hopefully you are feeling better with the plan as discussed at your visit. Please contact me should you have any questions.      Michael,    Dr. Catarino Emmanuel

## 2022-12-02 PROBLEM — Z13.220 LIPID SCREENING: Status: RESOLVED | Noted: 2022-11-02 | Resolved: 2022-12-02

## (undated) DEVICE — OPTIFOAM GENTLE SA, POSTOP, 4X10: Brand: MEDLINE

## (undated) DEVICE — SUTURE VCRL SZ 0 L36IN ABSRB UD L36MM CT-1 1/2 CIR J946H

## (undated) DEVICE — GLOVE SURG SZ 65 THK91MIL LTX FREE SYN POLYISOPRENE

## (undated) DEVICE — 3M™ TEGADERM™ TRANSPARENT FILM DRESSING FRAME STYLE, 1624W, 2-3/8 IN X 2-3/4 IN (6 CM X 7 CM), 100/CT 4CT/CASE: Brand: 3M™ TEGADERM™

## (undated) DEVICE — SPONGE GZ W4XL4IN COT 12 PLY TYP VII WVN C FLD DSGN

## (undated) DEVICE — FLOSEAL HEMOSTATIC MATRIX, 10ML: Brand: FLOSEAL HEMOSTATIC MATRIX

## (undated) DEVICE — TOTAL TRAY, 16FR 10ML SIL FOLEY, URN: Brand: MEDLINE

## (undated) DEVICE — SYR 20ML LL STRL LF --

## (undated) DEVICE — 3M™ TEGADERM™ TRANSPARENT FILM DRESSING FRAME STYLE, 1626, 4 IN X 4-3/4 IN (10 CM X 12 CM), 50/CT 4CT/CASE: Brand: 3M™ TEGADERM™

## (undated) DEVICE — CANISTER, RIGID, 3000CC: Brand: MEDLINE INDUSTRIES, INC.

## (undated) DEVICE — GLOVE ORTHO 7 1/2   MSG9475

## (undated) DEVICE — SUTURE VCRL SZ 2-0 L36IN ABSRB UD L36MM CT-1 1/2 CIR J945H

## (undated) DEVICE — AEGIS 1" DISK 4MM HOLE, PEEL OPEN: Brand: MEDLINE

## (undated) DEVICE — ACCY PA100-A LEGEND LUB/DIFFUSER 4 PACK: Brand: MIDAS REX®

## (undated) DEVICE — SOLUTION IRRIG 1000ML STRL H2O USP PLAS POUR BTL

## (undated) DEVICE — SUTURE VCRL SZ 2-0 L27IN ABSRB UD L26MM CT-2 1/2 CIR J269H

## (undated) DEVICE — DRAIN KT WND 10FR RND 400ML --

## (undated) DEVICE — CATHETER IV 14GA L1.25IN TEF STR HUB INTROCAN SFTY

## (undated) DEVICE — GLOVE ORTHO 8   MSG9480

## (undated) DEVICE — 1010 S-DRAPE TOWEL DRAPE 10/BX: Brand: STERI-DRAPE™

## (undated) DEVICE — GLOVE SURG SZ 8 L12IN FNGR THK79MIL GRN LTX FREE

## (undated) DEVICE — GLOVE SURG SZ 7 L12IN FNGR THK79MIL GRN LTX FREE

## (undated) DEVICE — GOWN,SIRUS,NONRNF,SETINSLV,XL,20/CS: Brand: MEDLINE

## (undated) DEVICE — SUTURE ABSORBABLE 3-0 PS-1 18 IN UD MONOCRYL + STRATAFIX SXMP1B102

## (undated) DEVICE — TIP CLEANER: Brand: VALLEYLAB

## (undated) DEVICE — GLOVE ORANGE PI 7 1/2   MSG9075

## (undated) DEVICE — COVER LT HNDL PLAS RIG 1 PER PK

## (undated) DEVICE — THE MILL DISPOSABLE - MEDIUM

## (undated) DEVICE — LAMINECTOMY-SFMC: Brand: MEDLINE INDUSTRIES, INC.

## (undated) DEVICE — NEEDLE HYPO 18GA L1.5IN PNK S STL HUB POLYPR SHLD REG BVL

## (undated) DEVICE — SUTURE STRATAFIX SPRL SZ 1 L14IN ABSRB VLT L48CM CTX 1/2 SXPD2B405

## (undated) DEVICE — SUTURE VCRL SZ 1 L36IN ABSRB UD L36MM CT-1 1/2 CIR J947H

## (undated) DEVICE — DRAPE SURG UTIL 26X15 IN W/ TAPE N INVASIVE MULT LAYR DISP

## (undated) DEVICE — TOOL 14MH30 LEGEND 14CM 3MM: Brand: MIDAS REX ™

## (undated) DEVICE — ADHESIVE SKIN CLOSURE 4X22 CM PREMIERPRO EXOFINFUSION DISP

## (undated) DEVICE — JACKSON TABLE POSITIONER KIT: Brand: MEDLINE INDUSTRIES, INC.

## (undated) DEVICE — ALCOHOL RUBBING ISO 16OZ 70%